# Patient Record
Sex: MALE | Race: WHITE | NOT HISPANIC OR LATINO | Employment: OTHER | URBAN - METROPOLITAN AREA
[De-identification: names, ages, dates, MRNs, and addresses within clinical notes are randomized per-mention and may not be internally consistent; named-entity substitution may affect disease eponyms.]

---

## 2024-02-29 ENCOUNTER — LAB (OUTPATIENT)
Dept: LAB | Facility: LAB | Age: 69
End: 2024-02-29
Payer: COMMERCIAL

## 2024-02-29 DIAGNOSIS — Z79.899 OTHER LONG TERM (CURRENT) DRUG THERAPY: ICD-10-CM

## 2024-02-29 DIAGNOSIS — Z79.899 OTHER LONG TERM (CURRENT) DRUG THERAPY: Primary | ICD-10-CM

## 2024-02-29 LAB
ALBUMIN SERPL BCP-MCNC: 3.8 G/DL (ref 3.4–5)
ALP SERPL-CCNC: 52 U/L (ref 33–136)
ALT SERPL W P-5'-P-CCNC: 14 U/L (ref 10–52)
AST SERPL W P-5'-P-CCNC: 13 U/L (ref 9–39)
BASOPHILS # BLD AUTO: 0.04 X10*3/UL (ref 0–0.1)
BASOPHILS NFR BLD AUTO: 0.4 %
BILIRUB DIRECT SERPL-MCNC: 0.1 MG/DL (ref 0–0.3)
BILIRUB SERPL-MCNC: 0.5 MG/DL (ref 0–1.2)
EOSINOPHIL # BLD AUTO: 0.21 X10*3/UL (ref 0–0.7)
EOSINOPHIL NFR BLD AUTO: 2.3 %
ERYTHROCYTE [DISTWIDTH] IN BLOOD BY AUTOMATED COUNT: 13.7 % (ref 11.5–14.5)
HCT VFR BLD AUTO: 43.8 % (ref 41–52)
HGB BLD-MCNC: 15 G/DL (ref 13.5–17.5)
IMM GRANULOCYTES # BLD AUTO: 0.07 X10*3/UL (ref 0–0.7)
IMM GRANULOCYTES NFR BLD AUTO: 0.8 % (ref 0–0.9)
LYMPHOCYTES # BLD AUTO: 2.34 X10*3/UL (ref 1.2–4.8)
LYMPHOCYTES NFR BLD AUTO: 25.9 %
MCH RBC QN AUTO: 33.4 PG (ref 26–34)
MCHC RBC AUTO-ENTMCNC: 34.2 G/DL (ref 32–36)
MCV RBC AUTO: 98 FL (ref 80–100)
MONOCYTES # BLD AUTO: 0.96 X10*3/UL (ref 0.1–1)
MONOCYTES NFR BLD AUTO: 10.6 %
NEUTROPHILS # BLD AUTO: 5.41 X10*3/UL (ref 1.2–7.7)
NEUTROPHILS NFR BLD AUTO: 60 %
NRBC BLD-RTO: 0 /100 WBCS (ref 0–0)
PLATELET # BLD AUTO: 248 X10*3/UL (ref 150–450)
PROT SERPL-MCNC: 6.1 G/DL (ref 6.4–8.2)
RBC # BLD AUTO: 4.49 X10*6/UL (ref 4.5–5.9)
WBC # BLD AUTO: 9 X10*3/UL (ref 4.4–11.3)

## 2024-02-29 PROCEDURE — 80076 HEPATIC FUNCTION PANEL: CPT

## 2024-02-29 PROCEDURE — 85025 COMPLETE CBC W/AUTO DIFF WBC: CPT

## 2024-02-29 PROCEDURE — 36415 COLL VENOUS BLD VENIPUNCTURE: CPT

## 2024-04-01 ENCOUNTER — HOSPITAL ENCOUNTER (EMERGENCY)
Facility: HOSPITAL | Age: 69
Discharge: HOME | End: 2024-04-01
Payer: COMMERCIAL

## 2024-04-01 ENCOUNTER — APPOINTMENT (OUTPATIENT)
Dept: RADIOLOGY | Facility: HOSPITAL | Age: 69
End: 2024-04-01
Payer: COMMERCIAL

## 2024-04-01 VITALS
HEART RATE: 74 BPM | HEIGHT: 65 IN | BODY MASS INDEX: 27.82 KG/M2 | TEMPERATURE: 97.2 F | OXYGEN SATURATION: 97 % | SYSTOLIC BLOOD PRESSURE: 114 MMHG | WEIGHT: 167 LBS | DIASTOLIC BLOOD PRESSURE: 73 MMHG | RESPIRATION RATE: 16 BRPM

## 2024-04-01 DIAGNOSIS — J02.9 ACUTE PHARYNGITIS, UNSPECIFIED ETIOLOGY: Primary | ICD-10-CM

## 2024-04-01 LAB
ANION GAP SERPL CALC-SCNC: 12 MMOL/L (ref 10–20)
BASOPHILS # BLD AUTO: 0.04 X10*3/UL (ref 0–0.1)
BASOPHILS NFR BLD AUTO: 0.5 %
BUN SERPL-MCNC: 27 MG/DL (ref 6–23)
CALCIUM SERPL-MCNC: 9.1 MG/DL (ref 8.6–10.3)
CHLORIDE SERPL-SCNC: 100 MMOL/L (ref 98–107)
CO2 SERPL-SCNC: 27 MMOL/L (ref 21–32)
CREAT SERPL-MCNC: 0.8 MG/DL (ref 0.5–1.3)
EGFRCR SERPLBLD CKD-EPI 2021: >90 ML/MIN/1.73M*2
EOSINOPHIL # BLD AUTO: 0.15 X10*3/UL (ref 0–0.7)
EOSINOPHIL NFR BLD AUTO: 1.7 %
ERYTHROCYTE [DISTWIDTH] IN BLOOD BY AUTOMATED COUNT: 13.6 % (ref 11.5–14.5)
GLUCOSE SERPL-MCNC: 98 MG/DL (ref 74–99)
HCT VFR BLD AUTO: 43.4 % (ref 41–52)
HGB BLD-MCNC: 15.2 G/DL (ref 13.5–17.5)
IMM GRANULOCYTES # BLD AUTO: 0.06 X10*3/UL (ref 0–0.7)
IMM GRANULOCYTES NFR BLD AUTO: 0.7 % (ref 0–0.9)
LYMPHOCYTES # BLD AUTO: 1.71 X10*3/UL (ref 1.2–4.8)
LYMPHOCYTES NFR BLD AUTO: 19.8 %
MCH RBC QN AUTO: 32.7 PG (ref 26–34)
MCHC RBC AUTO-ENTMCNC: 35 G/DL (ref 32–36)
MCV RBC AUTO: 93 FL (ref 80–100)
MONOCYTES # BLD AUTO: 1 X10*3/UL (ref 0.1–1)
MONOCYTES NFR BLD AUTO: 11.6 %
NEUTROPHILS # BLD AUTO: 5.68 X10*3/UL (ref 1.2–7.7)
NEUTROPHILS NFR BLD AUTO: 65.7 %
NRBC BLD-RTO: 0 /100 WBCS (ref 0–0)
PLATELET # BLD AUTO: 277 X10*3/UL (ref 150–450)
POTASSIUM SERPL-SCNC: 4.3 MMOL/L (ref 3.5–5.3)
RBC # BLD AUTO: 4.65 X10*6/UL (ref 4.5–5.9)
S PYO DNA THROAT QL NAA+PROBE: NOT DETECTED
SODIUM SERPL-SCNC: 135 MMOL/L (ref 136–145)
WBC # BLD AUTO: 8.6 X10*3/UL (ref 4.4–11.3)

## 2024-04-01 PROCEDURE — 85025 COMPLETE CBC W/AUTO DIFF WBC: CPT | Performed by: NURSE PRACTITIONER

## 2024-04-01 PROCEDURE — 80048 BASIC METABOLIC PNL TOTAL CA: CPT | Performed by: NURSE PRACTITIONER

## 2024-04-01 PROCEDURE — 36415 COLL VENOUS BLD VENIPUNCTURE: CPT | Performed by: NURSE PRACTITIONER

## 2024-04-01 PROCEDURE — 96361 HYDRATE IV INFUSION ADD-ON: CPT

## 2024-04-01 PROCEDURE — 87651 STREP A DNA AMP PROBE: CPT | Performed by: NURSE PRACTITIONER

## 2024-04-01 PROCEDURE — 99284 EMERGENCY DEPT VISIT MOD MDM: CPT | Mod: 25

## 2024-04-01 PROCEDURE — 96374 THER/PROPH/DIAG INJ IV PUSH: CPT

## 2024-04-01 PROCEDURE — 2500000004 HC RX 250 GENERAL PHARMACY W/ HCPCS (ALT 636 FOR OP/ED): Performed by: NURSE PRACTITIONER

## 2024-04-01 PROCEDURE — 2550000001 HC RX 255 CONTRASTS: Performed by: NURSE PRACTITIONER

## 2024-04-01 PROCEDURE — 70491 CT SOFT TISSUE NECK W/DYE: CPT

## 2024-04-01 PROCEDURE — 70491 CT SOFT TISSUE NECK W/DYE: CPT | Performed by: STUDENT IN AN ORGANIZED HEALTH CARE EDUCATION/TRAINING PROGRAM

## 2024-04-01 RX ADMIN — IOHEXOL 75 ML: 350 INJECTION, SOLUTION INTRAVENOUS at 19:12

## 2024-04-01 RX ADMIN — SODIUM CHLORIDE 500 ML: 9 INJECTION, SOLUTION INTRAVENOUS at 18:14

## 2024-04-01 RX ADMIN — DEXAMETHASONE SODIUM PHOSPHATE 10 MG: 4 INJECTION, SOLUTION INTRAMUSCULAR; INTRAVENOUS at 18:16

## 2024-04-01 ASSESSMENT — PAIN DESCRIPTION - PAIN TYPE: TYPE: CHRONIC PAIN

## 2024-04-01 ASSESSMENT — COLUMBIA-SUICIDE SEVERITY RATING SCALE - C-SSRS
6. HAVE YOU EVER DONE ANYTHING, STARTED TO DO ANYTHING, OR PREPARED TO DO ANYTHING TO END YOUR LIFE?: NO
2. HAVE YOU ACTUALLY HAD ANY THOUGHTS OF KILLING YOURSELF?: NO
1. IN THE PAST MONTH, HAVE YOU WISHED YOU WERE DEAD OR WISHED YOU COULD GO TO SLEEP AND NOT WAKE UP?: NO

## 2024-04-01 ASSESSMENT — LIFESTYLE VARIABLES
HAVE YOU EVER FELT YOU SHOULD CUT DOWN ON YOUR DRINKING: NO
HAVE PEOPLE ANNOYED YOU BY CRITICIZING YOUR DRINKING: NO
TOTAL SCORE: 0
EVER HAD A DRINK FIRST THING IN THE MORNING TO STEADY YOUR NERVES TO GET RID OF A HANGOVER: NO
EVER FELT BAD OR GUILTY ABOUT YOUR DRINKING: NO

## 2024-04-01 ASSESSMENT — PAIN - FUNCTIONAL ASSESSMENT: PAIN_FUNCTIONAL_ASSESSMENT: 0-10

## 2024-04-01 ASSESSMENT — PAIN SCALES - GENERAL: PAINLEVEL_OUTOF10: 4

## 2024-04-01 ASSESSMENT — PAIN DESCRIPTION - LOCATION: LOCATION: BACK

## 2024-04-01 ASSESSMENT — PAIN DESCRIPTION - ORIENTATION: ORIENTATION: LOWER

## 2024-04-01 NOTE — ED PROVIDER NOTES
HPI   Chief Complaint   Patient presents with    Sore Throat     Started 3/25/24, worsened on 3/29/24 and was unable to eat or drink much due to the discomfort. Saw UC 3/30/24, gave him antibiotics and some improvement is noted.     Ear Fullness    Back Pain    Neck Pain       This is a 68-year-old  male presenting to the emergency room with complaints of a sore throat for the past week and a half.  The patient reported that he was having a hard time swallowing.  The patient had to eat and everything bagel on Thursday morning.  The patient had complaints of right ear pain that developed Friday.  The patient was coughing up clear phlegm.  He took cold medicine over-the-counter reported no improvement of his symptoms.  He went to the urgent care on Saturday.  He reported that he was having a hard time turning his head.  He was diagnosed with an ear infection and strep throat.  The patient was given a prescription for amoxicillin.  He has been taking the medication for the past 2 days.  The patient reports that he was able to force down a protein drink and his antibiotic.  He did not take any of his other medications.  He has been checking his blood sugars and his blood pressure.  The patient reported that when he went was able to force anything down it did not come back up.  He is not having any fevers.  Denies any chest pain, shortness of breath, dizziness, palpitations, paresthesias, focal weakness.  He is not having any abdominal pain.  He reports that he has a deviated esophagus.  He tried to get into ENT today and was told by his primary care physician at [facility] to come to the emergency room for evaluation.      History provided by:  Patient   used: No                        Pascual Coma Scale Score: 15                     Patient History   No past medical history on file.  No past surgical history on file.  No family history on file.  Social History     Tobacco Use    Smoking  status: Former     Types: Cigarettes    Smokeless tobacco: Never   Vaping Use    Vaping Use: Never used   Substance Use Topics    Alcohol use: Not Currently    Drug use: Never       Physical Exam   ED Triage Vitals [04/01/24 1525]   Temperature Heart Rate Respirations BP   36.2 °C (97.2 °F) 89 18 111/74      Pulse Ox Temp Source Heart Rate Source Patient Position   96 % Temporal Monitor Sitting      BP Location FiO2 (%)     Left arm --       Physical Exam  HENT:      Head: Normocephalic and atraumatic.      Right Ear: Hearing and ear canal normal. Tympanic membrane is erythematous.      Left Ear: Hearing, tympanic membrane and ear canal normal.      Nose: Nose normal.      Mouth/Throat:      Mouth: Mucous membranes are moist.      Pharynx: Uvula midline. Posterior oropharyngeal erythema present.      Tonsils: No tonsillar exudate or tonsillar abscesses. 1+ on the right. 1+ on the left.   Cardiovascular:      Rate and Rhythm: Normal rate and regular rhythm.      Pulses: Normal pulses.      Heart sounds: Normal heart sounds.   Pulmonary:      Effort: Pulmonary effort is normal.      Breath sounds: Normal breath sounds.   Abdominal:      General: Bowel sounds are normal.      Palpations: Abdomen is soft.   Musculoskeletal:         General: Normal range of motion.      Cervical back: Normal range of motion.   Lymphadenopathy:      Cervical: No cervical adenopathy.   Skin:     General: Skin is warm.      Capillary Refill: Capillary refill takes less than 2 seconds.   Neurological:      General: No focal deficit present.      Mental Status: He is alert.   Psychiatric:         Mood and Affect: Mood normal.         ED Course & MDM   Diagnoses as of 04/01/24 2036   Acute pharyngitis, unspecified etiology       Medical Decision Making  Patient was seen and evaluated by the nurse practitioner, Le Mejias.  The patient was able to swallow and maintain his oral secretions.  The patient's vital signs were stable.  He did not  appear to be in any acute distress.  Differential diagnosis includes epiglottitis, Peritonsillar abscess, strep pharyngitis, tonsillitis, pharyngitis, acute obstruction, Cuauhtemoc's angina, or other acute process.  Patient has no clinical indication of Cuauhtemoc's angina or acute obstruction.  A saline lock was established.  Laboratory studies were drawn with results as noted.  The patient was administered a 500 mL normal saline bolus and Decadron 10 mg IVP.  The patient's laboratory studies showed he does not have any acute leukocytosis.  A rapid strep test was performed and was negative.  A CT of the neck with contrast was performed and showed diffuse thickening of the oropharyngeal mucosa and palatine tonsils with mild rigidity of the right palatine tonsil without peritonsillar abscess.  Findings are most consistent with pharyngitis or tonsillitis.  There are no enlarged lymph nodes.  Patient was notified of his laboratory and imaging results.  He was advised to continue using his antibiotic as prescribed.  He is to provide symptomatic care.  The patient is to follow up with their primary care physician in the next 2-3 days.  The patient is to return to the ED worse in any way.  The patient was discharged in stable condition with computer discharge instructions given. Patient was agreeable with discharge planning.        Procedure  Procedures     EMORY Crystal-LYNDSAY  04/01/24 2040

## 2024-04-03 PROBLEM — Z00.00 ROUTINE HEALTH MAINTENANCE: Status: ACTIVE | Noted: 2024-04-03

## 2024-04-03 PROBLEM — M25.511 CHRONIC RIGHT SHOULDER PAIN: Status: ACTIVE | Noted: 2022-12-13

## 2024-04-03 PROBLEM — G89.29 CHRONIC RIGHT SHOULDER PAIN: Status: ACTIVE | Noted: 2022-12-13

## 2024-04-03 PROBLEM — I10 HYPERTENSION: Status: ACTIVE | Noted: 2024-04-03

## 2024-04-03 PROBLEM — I50.9: Status: ACTIVE | Noted: 2024-04-03

## 2024-04-03 PROBLEM — M79.672 LEFT FOOT PAIN: Status: ACTIVE | Noted: 2024-02-16

## 2024-04-03 PROBLEM — I49.3 PVC (PREMATURE VENTRICULAR CONTRACTION): Status: ACTIVE | Noted: 2018-12-06

## 2024-04-03 PROBLEM — H91.90 HEARING LOSS: Status: ACTIVE | Noted: 2022-11-17

## 2024-04-03 PROBLEM — G95.89 MYELOMALACIA OF CERVICAL CORD (MULTI): Status: ACTIVE | Noted: 2024-03-14

## 2024-04-03 PROBLEM — M25.551 PAIN OF RIGHT HIP: Status: ACTIVE | Noted: 2024-03-14

## 2024-04-03 PROBLEM — I25.10 CORONARY ARTERY DISEASE INVOLVING NATIVE CORONARY ARTERY OF NATIVE HEART WITHOUT ANGINA PECTORIS: Status: ACTIVE | Noted: 2019-06-20

## 2024-04-03 PROBLEM — I42.8 CARDIOMYOPATHY, NONISCHEMIC (MULTI): Status: ACTIVE | Noted: 2024-04-03

## 2024-04-03 PROBLEM — M47.12 CERVICAL SPONDYLOSIS WITH MYELOPATHY: Status: ACTIVE | Noted: 2022-08-29

## 2024-04-03 PROBLEM — Z86.010 HISTORY OF COLONIC POLYPS: Status: ACTIVE | Noted: 2023-11-14

## 2024-04-03 PROBLEM — E53.8 B12 DEFICIENCY: Status: ACTIVE | Noted: 2020-03-13

## 2024-04-03 PROBLEM — M54.16 LUMBAR RADICULITIS: Status: ACTIVE | Noted: 2024-03-14

## 2024-04-03 PROBLEM — Z95.810 S/P ICD (INTERNAL CARDIAC DEFIBRILLATOR) PROCEDURE: Status: ACTIVE | Noted: 2024-04-03

## 2024-04-03 PROBLEM — Z86.0100 HISTORY OF COLONIC POLYPS: Status: ACTIVE | Noted: 2023-11-14

## 2024-04-03 PROBLEM — M16.9 OA (OSTEOARTHRITIS) OF HIP: Status: ACTIVE | Noted: 2024-03-14

## 2024-04-03 PROBLEM — H91.90 HEARING LOSS: Status: RESOLVED | Noted: 2022-11-17 | Resolved: 2024-04-03

## 2024-04-03 PROBLEM — D12.6 TUBULAR ADENOMA OF COLON: Status: ACTIVE | Noted: 2018-12-21

## 2024-04-03 NOTE — ASSESSMENT & PLAN NOTE
Insulin Instructions   insulin glargine (Lantus SoloStar) 100 UNIT/ML SOPN injection   Inject 28 Units under the skin at bedtime.     Biguanides Instructions   metformin (GLUCOPHAGE) 1000 MG tablet   TAKE 1 TABLET BY MOUTH 2 TIMES DAILY WITH MEALS.     Sodium-Glucose Co-Transporter 2 (SGLT2) Inhibitors Instructions   empagliflozin (Jardiance) 10 MG tablet   Take 1 Tablet by mouth daily.     Incretin Mimetic Agents Instructions   liraglutide (VICTOZA) 18 MG/3ML SOPN injection   Inject 1.8 mg under the skin daily.

## 2024-04-08 ENCOUNTER — OFFICE VISIT (OUTPATIENT)
Dept: OTOLARYNGOLOGY | Facility: CLINIC | Age: 69
End: 2024-04-08
Payer: COMMERCIAL

## 2024-04-08 VITALS — BODY MASS INDEX: 29.45 KG/M2 | TEMPERATURE: 96.1 F | WEIGHT: 177 LBS

## 2024-04-08 DIAGNOSIS — R13.10 DYSPHAGIA, UNSPECIFIED TYPE: Primary | ICD-10-CM

## 2024-04-08 DIAGNOSIS — R49.0 HOARSENESS OF VOICE: ICD-10-CM

## 2024-04-08 PROCEDURE — 31575 DIAGNOSTIC LARYNGOSCOPY: CPT

## 2024-04-08 PROCEDURE — 99204 OFFICE O/P NEW MOD 45 MIN: CPT

## 2024-04-08 PROCEDURE — 1036F TOBACCO NON-USER: CPT

## 2024-04-08 PROCEDURE — 4010F ACE/ARB THERAPY RXD/TAKEN: CPT

## 2024-04-08 PROCEDURE — 1160F RVW MEDS BY RX/DR IN RCRD: CPT

## 2024-04-08 PROCEDURE — 1159F MED LIST DOCD IN RCRD: CPT

## 2024-04-08 RX ORDER — EMPAGLIFLOZIN 10 MG/1
10 TABLET, FILM COATED ORAL
COMMUNITY
Start: 2024-01-23

## 2024-04-08 RX ORDER — FUROSEMIDE 40 MG/1
40 TABLET ORAL
COMMUNITY
Start: 2023-05-30 | End: 2024-05-29

## 2024-04-08 RX ORDER — INSULIN GLARGINE 100 [IU]/ML
28 INJECTION, SOLUTION SUBCUTANEOUS
COMMUNITY
Start: 2023-11-17

## 2024-04-08 RX ORDER — LISINOPRIL 5 MG/1
2.5 TABLET ORAL
COMMUNITY
Start: 2012-07-18

## 2024-04-08 RX ORDER — ATORVASTATIN CALCIUM 40 MG/1
40 TABLET, FILM COATED ORAL
COMMUNITY
Start: 2023-09-20 | End: 2024-09-19

## 2024-04-08 RX ORDER — LORATADINE 10 MG/1
10 TABLET ORAL
COMMUNITY
Start: 2023-09-20 | End: 2024-09-19

## 2024-04-08 RX ORDER — SPIRONOLACTONE 25 MG/1
12.5 TABLET ORAL
COMMUNITY
Start: 2023-11-03

## 2024-04-08 RX ORDER — AMOXICILLIN 875 MG/1
TABLET, FILM COATED ORAL
COMMUNITY
Start: 2024-03-30 | End: 2024-05-14 | Stop reason: ALTCHOICE

## 2024-04-08 RX ORDER — METOPROLOL SUCCINATE 200 MG/1
200 TABLET, EXTENDED RELEASE ORAL
COMMUNITY
Start: 2024-01-22 | End: 2025-01-21

## 2024-04-08 RX ORDER — METFORMIN HYDROCHLORIDE 1000 MG/1
1 TABLET ORAL
COMMUNITY
Start: 2023-08-04

## 2024-04-08 RX ORDER — ASPIRIN 81 MG/1
81 TABLET ORAL
COMMUNITY
Start: 2023-04-24

## 2024-04-08 ASSESSMENT — PATIENT HEALTH QUESTIONNAIRE - PHQ9
2. FEELING DOWN, DEPRESSED OR HOPELESS: NOT AT ALL
SUM OF ALL RESPONSES TO PHQ9 QUESTIONS 1 AND 2: 0
1. LITTLE INTEREST OR PLEASURE IN DOING THINGS: NOT AT ALL

## 2024-04-08 NOTE — PROGRESS NOTES
"Reason For Consult  Chief Complaint   Patient presents with    Dysphagia        HISTORY OF PRESENT ILLNESS:   Hilaire J Kiffer, who is a 68 y.o. male presenting for an initial visit for dysphagia and mild hoarseness.  The patient reports that the dysphagia started around 2 weeks ago. The patient developed painful swallowing and was unable to get food or liquids down for a 3 day period. Patient went to ED on 4/1/24 and was diagnosed with acute pharyngitis and received amoxicillin. Patient states his swallow has improved but has not been the same since prior spinal fusion procedure. Patient currently able to tolerate protein shakes and  food. Patient denies coughing or choking, states was previously unable to get food or liquids down, \" it was just sitting in throat\". Patient able to maintain weight with current diet.     Hoarseness:  Onset was one week ago.  This occurred suddenly.  Voice has worsened since onset.  Voice currently is characterized as mild hoarseness.     Past Medical History  He has a past medical history of H/O abdominal ultrasound (11/19/2020), H/O CT scan (04/01/2024), H/O magnetic resonance imaging of cervical spine (11/28/2023), H/O magnetic resonance imaging of cervical spine (09/06/2022), H/O pelvic x-ray (03/14/2024), and H/O x-ray of lumbar spine (03/14/2024).  Surgical History  He has no past surgical history on file.     Social History  He reports that he has quit smoking. His smoking use included cigarettes. He has never used smokeless tobacco. He reports that he does not currently use alcohol. He reports that he does not use drugs.    Allergies  Patient has no known allergies.    Review of Systems  All 10 systems were reviewed and negative except for above.      Physical Exam  CONSTITUTIONAL: Well developed, well nourished.    VOICE: mild hoarseness  RESPIRATION: Breathing comfortably, no stridor.    NEURO: Alert and oriented x3, cranial nerves II-XII intact and symmetric bilaterally.  "   EARS: Normal external ears, external auditory canals, normal hearing to conversational voice.    NOSE: External nose midline, anterior rhinoscopy is normal with limited visualization to the anterior aspect of the interior turbinates. No lesions noted.     ORAL CAVITY/OROPHARYNX/LIPS: Normal mucous membranes, normal floor of mouth/tongue/OP, no masses or lesions are noted.    SKIN: Neck skin left side scar from previous spinal procedure.   PSYCH: Alert and oriented with appropriate mood and affect.        Last Recorded Vitals  There were no vitals taken for this visit.    Procedure  PROCEDURE NOTE:  Recommended flexible laryngoscopy/stroboscopy.  Risks, benefits,  and alternatives were explained.  They wish to proceed and provide verbal consent.     PROCEDURE:  Flexible Laryngoscopy, CPT 67812    INDICATIONS: Inability to tolerate mirror exam or abnormal findings on mirror, Flexible Laryngoscopy/Stroboscopy performed to assess one of the followin. Diagnosis of symptomatic disorder involving the voice, swallow, upper aerodigestive tract, including GOLD disorders, or  2. Preoperative evaluation of vocal cord function for individuals undergoing surgery where the RLN or vagus nerves are at risk of injury, or  3. Further evaluation of abnormalities of the upper aerodigestive tract discovered by another modality, such as CT, MRI, bronchoscopy or EGD    Description of Procedure:    After adequate afrin and lidocaine spray, I advanced the endoscope.  Visualization of the nasopharynx, vallecula, posterior pharyngeal walls, pyriform, epiglottis and post cricoid areas was unremarkable.  The following laryngeal findings were noted:    vocal cord movement was bilateral weakness L>R  closure was bowing  Mucosal wave was not assessed   Compression was increased AP and  FVC L>R  edema was  mild  interarytenoid edema mild  lesions were none  the subglottis was widely patent  Pharyngeal wall squeeze was normal    Procedure well  tolerated.       ASSESSMENT AND PLAN:   This is an initial visit for dysphagia with clinical findings notable for bilateral vocal cord weakness L>R and increased FVC compression L>R. Swallowing is improved but still having some issues. Patient   Secondary issues identified and managed on this patient visit include: mild hoarseness    Diagnoses are exacerbated by: recent pharyngitis. Patient finishing scheduled antibiotic tomorrow.     We discussed the treatment options to include, medical and surgical options.  We have decided to proceed as follows:   1. Modified barium swallow.    2. Speech therapy for voice   3. Follow up in 2-3 months.

## 2024-04-09 ENCOUNTER — APPOINTMENT (OUTPATIENT)
Dept: PRIMARY CARE | Facility: CLINIC | Age: 69
End: 2024-04-09
Payer: COMMERCIAL

## 2024-04-22 ENCOUNTER — HOSPITAL ENCOUNTER (OUTPATIENT)
Dept: RADIOLOGY | Facility: HOSPITAL | Age: 69
Discharge: HOME | End: 2024-04-22
Payer: COMMERCIAL

## 2024-04-22 DIAGNOSIS — R13.12 OROPHARYNGEAL DYSPHAGIA: Primary | ICD-10-CM

## 2024-04-22 DIAGNOSIS — R13.10 DYSPHAGIA, UNSPECIFIED TYPE: ICD-10-CM

## 2024-04-22 PROCEDURE — 92611 MOTION FLUOROSCOPY/SWALLOW: CPT | Mod: GN | Performed by: PHARMACIST

## 2024-04-22 PROCEDURE — 2500000001 HC RX 250 WO HCPCS SELF ADMINISTERED DRUGS (ALT 637 FOR MEDICARE OP)

## 2024-04-22 PROCEDURE — 3430000001 HC RX 343 DIAGNOSTIC RADIOPHARMACEUTICALS

## 2024-04-22 PROCEDURE — 74230 X-RAY XM SWLNG FUNCJ C+: CPT

## 2024-04-22 PROCEDURE — 74230 X-RAY XM SWLNG FUNCJ C+: CPT | Performed by: RADIOLOGY

## 2024-04-22 RX ADMIN — BARIUM SULFATE 30 ML: 400 SUSPENSION ORAL at 13:37

## 2024-04-22 RX ADMIN — BARIUM SULFATE 700 MG: 700 TABLET ORAL at 13:36

## 2024-04-22 RX ADMIN — BARIUM SULFATE 10 ML: 400 PASTE ORAL at 13:36

## 2024-04-22 RX ADMIN — BARIUM SULFATE 5 ML: 400 SUSPENSION ORAL at 13:37

## 2024-04-22 RX ADMIN — BARIUM SULFATE 110 ML: 0.81 POWDER, FOR SUSPENSION ORAL at 13:37

## 2024-04-23 ENCOUNTER — APPOINTMENT (OUTPATIENT)
Dept: OTOLARYNGOLOGY | Facility: CLINIC | Age: 69
End: 2024-04-23
Payer: COMMERCIAL

## 2024-04-23 PROBLEM — R13.12 OROPHARYNGEAL DYSPHAGIA: Status: ACTIVE | Noted: 2024-04-23

## 2024-04-23 NOTE — PROCEDURES
"  Modified Barium Swallow Study     Patient Name: Hilaire J Kiffer  MRN: [id number]  : 1955  Today's Date: 24   Start time:  1301  Stop time:  1336  Time calculation (min) :  35 min       Recommendations:  Continue current diet of soft/bite-sized solids and thin liquids. Upright positioning during meals, slow rate of intake, small bites/sips, chew thoroughly, alternate liquids and solids as needed.     Assessment/Impression:    Full detailed SLP/Radiologist Modified Barium Swallow study report can be found under Chart Review tab, Imaging tab and  titled \"FL Modified Barium Swallow Study\"      Pt. Presenting with mild oropharyngeal dysphagia characterized by intermittent penetration of thin and nectar-thick liquids which pt cleared independently with throat clear maneuvers, and min pharyngeal residuals which pt reduced to trace with spontaneous dry swallows.    Plan:  Treatment/Interventions: Pharyngeal exercises, Oral motor exercises, Patient/family education, Bolus trials  SLP Plan: Pt would benefit from a course of dysphagia therapy to improve safety/efficiency with regular texture solids as well as to reduce penetration/aspiration risk. Pt was informed of this recommendation but declined to participate in ST at this time, reporting that he hopes to continue to improve without intervention. Pt was advised that he may request a referral for dysphagia therapy in the future if needs change.    Discussed POC: Patient  Patient/Caregiver Agreeable: No    Pain:   Rating 0-10: 0      Education:   Pt viewed flouro images while SLP educated re: swallow anatomy/physiology, results of study, risk for aspiration, recommended diet modifications, recommendation for skilled SLP services, and recommended safe swallow strategies. Note that increased instances of laryngeal penetration were observed during post-session playback compared to initial education session.  "

## 2024-04-25 ENCOUNTER — TELEPHONE (OUTPATIENT)
Dept: OTOLARYNGOLOGY | Facility: CLINIC | Age: 69
End: 2024-04-25
Payer: COMMERCIAL

## 2024-04-25 NOTE — TELEPHONE ENCOUNTER
Discussed results of Modified Barium Swallow with patient that was done on 04/22/2024. Patient did not want dysphagia therapy, but is willing to see Dr. Hopper for other options with oropharyngeal dysphagia. Will have  reach out to schedule appointment with Dr. Hopper.

## 2024-05-14 ENCOUNTER — OFFICE VISIT (OUTPATIENT)
Dept: PRIMARY CARE | Facility: CLINIC | Age: 69
End: 2024-05-14
Payer: COMMERCIAL

## 2024-05-14 ENCOUNTER — OFFICE VISIT (OUTPATIENT)
Dept: OTOLARYNGOLOGY | Facility: CLINIC | Age: 69
End: 2024-05-14
Payer: COMMERCIAL

## 2024-05-14 VITALS
WEIGHT: 191.6 LBS | TEMPERATURE: 95.6 F | SYSTOLIC BLOOD PRESSURE: 122 MMHG | BODY MASS INDEX: 30.79 KG/M2 | DIASTOLIC BLOOD PRESSURE: 76 MMHG | HEIGHT: 66 IN | OXYGEN SATURATION: 95 % | HEART RATE: 63 BPM

## 2024-05-14 VITALS — TEMPERATURE: 97.3 F | WEIGHT: 190.8 LBS | BODY MASS INDEX: 30.67 KG/M2 | HEIGHT: 66 IN

## 2024-05-14 DIAGNOSIS — Z00.00 ROUTINE HEALTH MAINTENANCE: ICD-10-CM

## 2024-05-14 DIAGNOSIS — E53.8 B12 DEFICIENCY: Primary | ICD-10-CM

## 2024-05-14 DIAGNOSIS — D12.6 TUBULAR ADENOMA OF COLON: ICD-10-CM

## 2024-05-14 DIAGNOSIS — F19.11 DRUG ABUSE IN REMISSION (MULTI): ICD-10-CM

## 2024-05-14 DIAGNOSIS — I25.10 CORONARY ARTERY DISEASE INVOLVING NATIVE CORONARY ARTERY OF NATIVE HEART WITHOUT ANGINA PECTORIS: ICD-10-CM

## 2024-05-14 DIAGNOSIS — E11.42 TYPE 2 DIABETES MELLITUS WITH DIABETIC POLYNEUROPATHY, WITH LONG-TERM CURRENT USE OF INSULIN (MULTI): ICD-10-CM

## 2024-05-14 DIAGNOSIS — I50.9 CONGESTIVE HEART FAILURE, NYHA CLASS 2, UNSPECIFIED CONGESTIVE HEART FAILURE TYPE (MULTI): ICD-10-CM

## 2024-05-14 DIAGNOSIS — I10 PRIMARY HYPERTENSION: ICD-10-CM

## 2024-05-14 DIAGNOSIS — R13.10 DYSPHAGIA, UNSPECIFIED TYPE: Primary | ICD-10-CM

## 2024-05-14 DIAGNOSIS — Z98.890 HISTORY OF CERVICAL SPINAL SURGERY: ICD-10-CM

## 2024-05-14 DIAGNOSIS — M47.12 CERVICAL SPONDYLOSIS WITH MYELOPATHY: ICD-10-CM

## 2024-05-14 DIAGNOSIS — S59.901A INJURY OF RIGHT ELBOW, INITIAL ENCOUNTER: ICD-10-CM

## 2024-05-14 DIAGNOSIS — H90.8 MIXED HEARING LOSS, UNILATERAL: ICD-10-CM

## 2024-05-14 DIAGNOSIS — Z12.5 SCREENING FOR PROSTATE CANCER: ICD-10-CM

## 2024-05-14 DIAGNOSIS — I47.29 PAROXYSMAL VENTRICULAR TACHYCARDIA (MULTI): ICD-10-CM

## 2024-05-14 DIAGNOSIS — E55.9 VITAMIN D DEFICIENCY: ICD-10-CM

## 2024-05-14 DIAGNOSIS — Z79.4 TYPE 2 DIABETES MELLITUS WITH DIABETIC POLYNEUROPATHY, WITH LONG-TERM CURRENT USE OF INSULIN (MULTI): ICD-10-CM

## 2024-05-14 DIAGNOSIS — R13.12 OROPHARYNGEAL DYSPHAGIA: ICD-10-CM

## 2024-05-14 PROBLEM — G95.89 MYELOMALACIA OF CERVICAL CORD (MULTI): Status: RESOLVED | Noted: 2024-03-14 | Resolved: 2024-05-14

## 2024-05-14 PROCEDURE — 4010F ACE/ARB THERAPY RXD/TAKEN: CPT | Performed by: OTOLARYNGOLOGY

## 2024-05-14 PROCEDURE — 31575 DIAGNOSTIC LARYNGOSCOPY: CPT | Performed by: OTOLARYNGOLOGY

## 2024-05-14 PROCEDURE — 3078F DIAST BP <80 MM HG: CPT | Performed by: NURSE PRACTITIONER

## 2024-05-14 PROCEDURE — 99215 OFFICE O/P EST HI 40 MIN: CPT | Performed by: OTOLARYNGOLOGY

## 2024-05-14 PROCEDURE — 1159F MED LIST DOCD IN RCRD: CPT | Performed by: OTOLARYNGOLOGY

## 2024-05-14 PROCEDURE — 1159F MED LIST DOCD IN RCRD: CPT | Performed by: NURSE PRACTITIONER

## 2024-05-14 PROCEDURE — 99205 OFFICE O/P NEW HI 60 MIN: CPT | Performed by: NURSE PRACTITIONER

## 2024-05-14 PROCEDURE — 1160F RVW MEDS BY RX/DR IN RCRD: CPT | Performed by: OTOLARYNGOLOGY

## 2024-05-14 PROCEDURE — 1036F TOBACCO NON-USER: CPT | Performed by: NURSE PRACTITIONER

## 2024-05-14 PROCEDURE — 4010F ACE/ARB THERAPY RXD/TAKEN: CPT | Performed by: NURSE PRACTITIONER

## 2024-05-14 PROCEDURE — G2211 COMPLEX E/M VISIT ADD ON: HCPCS | Performed by: NURSE PRACTITIONER

## 2024-05-14 PROCEDURE — 1036F TOBACCO NON-USER: CPT | Performed by: OTOLARYNGOLOGY

## 2024-05-14 PROCEDURE — 3074F SYST BP LT 130 MM HG: CPT | Performed by: NURSE PRACTITIONER

## 2024-05-14 PROCEDURE — 1160F RVW MEDS BY RX/DR IN RCRD: CPT | Performed by: NURSE PRACTITIONER

## 2024-05-14 RX ORDER — RANOLAZINE 1000 MG/1
1000 TABLET, EXTENDED RELEASE ORAL 2 TIMES DAILY
COMMUNITY

## 2024-05-14 ASSESSMENT — PATIENT HEALTH QUESTIONNAIRE - PHQ9
2. FEELING DOWN, DEPRESSED OR HOPELESS: NOT AT ALL
2. FEELING DOWN, DEPRESSED OR HOPELESS: NOT AT ALL
1. LITTLE INTEREST OR PLEASURE IN DOING THINGS: NOT AT ALL
SUM OF ALL RESPONSES TO PHQ9 QUESTIONS 1 & 2: 0
SUM OF ALL RESPONSES TO PHQ9 QUESTIONS 1 AND 2: 0
1. LITTLE INTEREST OR PLEASURE IN DOING THINGS: NOT AT ALL

## 2024-05-14 NOTE — PROGRESS NOTES
Patient: Hilaire J Kiffer   MRN: [id number] YOB: 1955   Sex: male Age: 68 y.o.  Date of Service: 2024       ASSESSMENT AND PLAN  I discussed the findings with Hilaire J Kiffer and have recommended the followin. Dysphagia acute on chronic, history of ACDF, acute dysphagia resolving. We discussed esophagoscopy and dilation for his baseline solid food dysphagia but he would like to hold off for now. He will contact our office if he is interested in proceeding. [place] [facility] [place] [facility] given he has a pacemaker/defibrillator      CHIEF COMPLAINT  Chief Complaint   Patient presents with    Dysphagia       HISTORY OF PRESENT ILLNESS  Hilaire J Kiffer is a 68 y.o. male referred by Alen Lala APRN-C* for evaluation of dysphagia.  The patient has a history of dysphagia to solids and liquids s/p cervical fusion surgery approx. 2 years ago. Since then his swallow has been difficult and worsened when pt became ill 6 weeks ago with a sore throat and an ear ache such that he was unable to swallow anything. He went 3 days without eating. He went to urgent care and got amoxicillin. Then went to the ED at [place] where he had a CT scan. Now he reports is swallowing is slowly improving, about where he was before this most recent episode.    The dysphagia history includes:      Dysphagia for solids               yes    Dysphagia for liquids              no    Dysphagia for pills                  no  Associated weight loss            no    Recent pneumonia/bronchitis  no  GERD/Acid reflux   no    PMH: DM, pacemaker/defibrillator  Meds: No blood thinners  SH: former smoker    ADDITIONAL HISTORY  Past Medical History  He has a past medical history of H/O abdominal ultrasound (2020), H/O CT scan (2024), H/O magnetic resonance imaging of cervical spine (2023), H/O magnetic resonance imaging of cervical spine (2022), H/O pelvic x-ray (2024), and H/O x-ray of  "lumbar spine (03/14/2024). Surgical History  He has a past surgical history that includes pacemaker placement.   Social History  He reports that he has quit smoking. His smoking use included cigarettes. He has never used smokeless tobacco. He reports that he does not currently use alcohol. He reports that he does not use drugs. Allergies  Patient has no known allergies.     Family History  Family History   Problem Relation Name Age of Onset    Heart attack Mother      Heart attack Father      Diabetes type II Father      Alzheimer's disease Father      Heart attack Brother  52        REVIEW OF SYSTEMS  All 10 systems were reviewed and negative except for above.      PHYSICAL EXAM  ENT Physical Exam   GENERAL: Well-nourished and developed, alert and appropriate, no distress, voice [id number]  RESPIRATORY: Breathing quietly, no stridor  HEAD: Normocephalic atraumatic  FACE: Symmetric, no masses or lesions  EYES:  Pupils reactive, sclera clear, external ocular muscles intact, no nystagmus.    EARS:  Pinnae normal.  NOSE:  No anterior lesions, masses or polyps.  ORAL CAVITY/OROPHARYNX:  Buccal mucosa is moist without lesions or masses, tongue midline and palate elevates symmetrically. Tongue mobility intact.  NECK:  Soft. There is no lymphadenopathy or thyromegaly.    NEUROLOGIC:  Cranial nerves II-XII grossly intact.       Last Recorded Vitals  Temperature 36.3 °C (97.3 °F), height 1.664 m (5' 5.5\"), weight 86.5 kg (190 lb 12.8 oz).    RESULTS    Patient Reported Outcome Measures  N/A    Laboratory, Radiology, and Pathology  I personally reviewed the following results, with the following interpretation:   [facility] [place]        PROCEDURES  Laryngoscopy    Date/Time: 5/14/2024 1:58 PM    Performed by: Yeimy Hopper MD  Authorized by: Yeimy Hopper MD       Flexible Fiberoptic Laryngoscopy     Patient failed a mirror exam due to limitations of equipment and the need for laryngoscopy to assess laryngeal anatomy and " function    PREOPERATIVE DIAGNOSIS: dysphagia    POSTOPERATIVE DIAGNOSIS: Same    PROCEDURE: Transnasal videolaryngoscopy    ANESTHESIA:  Topical    COMPLICATIONS:  None    SPECIMENS:  None    PROCEDURE IN DETAIL:  The patient was brought into the endoscopy suite, placed in the upright position.  The nasal cavity was topically decongested anesthetized.  The distal chip video laryngoscope was passed through the nasal cavity.  The nasal cavity and nasopharynx were within normal limits except noted below. The following findings on laryngoscopy were noted:         Tongue Base: no masses or lesions          Left vocal fold mobility: mobile         Right vocal fold mobility: mobile         Glottal closure: complete         Laryngeal muscle tension: L>R FF tension         Symmetry: mildly asymmetric         Vocal fold free edge: no masses or lesions          Other: n/a    The patient tolerated the procedure well.        ----------------------------------------------------------------------  Yeimy Hopper MD, MAEd    [facility]  Department of Otolaryngology - Head and Neck Surgery  [facility]    The total time I spent in care of this patient today (excluding time spent on other billable services) is as follows:    Time Spent  Prep time on day of patient encounter: 10 minutes  Time spent directly with patient, family or caregiver: 20 minutes  Additional Time Spent on Patient Care Activities: 5 minutes  Documentation Time: 10 minutes  Other Time Spent: 0 minutes  Total: 45 minutes

## 2024-05-14 NOTE — PATIENT INSTRUCTIONS
Routine fasting labs at your convenience  Schedule physical exam with PCP  XR elbow today    Check insurance regarding Shingrix    I will get back to you on supplement

## 2024-05-14 NOTE — PROGRESS NOTES
Subjective   Patient ID: Hilaire J Kiffer is a 68 y.o. male who presents for new patient establish care (New pt establish care/No concerns).    Saw ent  For throat closing  Saw muscle in neck is not retracting all the way  Likely from neck surgery  At 1p today, seeing  Right hip pain  Arthritis  Want to take umary supplement  Had a fall 2 mo ago  Hit elbow right side  C-scope [facility] a few years ago        Review of Systems  ROS completely negative except what was mentioned in the HPI.  Problem List, surgical, social, and family histories which were reviewed and updated as necessary within the EMR. I also personally reviewed the notes, labs, and imaging that pertained to what was documented or discussed in the HPI.    Objective   Physical Exam  Nursing note reviewed.   Constitutional:       Appearance: Normal appearance.   HENT:      Head: Normocephalic.      Right Ear: Tympanic membrane, ear canal and external ear normal.      Left Ear: Tympanic membrane, ear canal and external ear normal.      Nose: Nose normal.      Mouth/Throat:      Mouth: Mucous membranes are moist.      Pharynx: Oropharynx is clear.   Eyes:      Extraocular Movements: Extraocular movements intact.      Conjunctiva/sclera: Conjunctivae normal.      Pupils: Pupils are equal, round, and reactive to light.   Cardiovascular:      Rate and Rhythm: Normal rate and regular rhythm.      Pulses: Normal pulses.      Heart sounds: Normal heart sounds.   Pulmonary:      Effort: Pulmonary effort is normal.   Musculoskeletal:         General: Normal range of motion.      Cervical back: Normal range of motion. No tenderness.   Lymphadenopathy:      Cervical: No cervical adenopathy.   Neurological:      General: No focal deficit present.      Mental Status: He is alert and oriented to person, place, and time. Mental status is at baseline.   Psychiatric:         Mood and Affect: Mood normal.         Behavior: Behavior normal.         Thought Content: Thought  "content normal.         Judgment: Judgment normal.         /76   Pulse 63   Temp 35.3 °C (95.6 °F)   Ht 1.664 m (5' 5.5\")   Wt 86.9 kg (191 lb 9.6 oz)   SpO2 95%   BMI 31.40 kg/m²     Assessment/Plan    Problem List Items Addressed This Visit       B12 deficiency - Primary    Overview     3/2020 on metformin,   8/2023: B12 level 272         Current Assessment & Plan     Check level  Not currently on supplement         Relevant Orders    Vitamin B12    Cervical spondylosis with myelopathy    Overview     S/p laminectomy in 1990's.   Still had occ sxs of neuropathy.   Surgical repair in 2022, now has dysphagia.   Secondary to recent orthopedic operation.         Current Assessment & Plan     Reviewed CT and barium swallow  Has appt with ENT today to go over results  Await for consultation         CHF NYHA class II (symptoms with moderately strenuous activities) (Multi)    Overview     On toprol xl and lisinopril and possible study medication   2010 EF 50%, followed by Dr. Turpin   2018 EF 40%   2020 EF 35%, bmp ok  BP goal <130/80  Managed by [facility]         Current Assessment & Plan     On diuretics, ACEi, BB  Followed closely by cardiology         Relevant Medications    ranolazine (Ranexa) 1,000 mg 12 hr tablet    Other Relevant Orders    CBC and Auto Differential    Comprehensive Metabolic Panel    TSH with reflex to Free T4 if abnormal    Vitamin D 25-Hydroxy,Total (for eval of Vitamin D levels)    Lipid Panel    Urinalysis with Reflex Culture and Microscopic    Vitamin B12    Prostate Specific Antigen, Screen    Hemoglobin A1C    Coronary artery disease involving native coronary artery of native heart without angina pectoris    Overview     6/2019 Coronary calcium score 295.    statin and asa         Relevant Medications    ranolazine (Ranexa) 1,000 mg 12 hr tablet    Drug abuse in remission (Multi)    Overview     1991 - quit smoking, drinking, cocaine.         Current Assessment & Plan     " Has remained sober since 1991         Hypertension    Overview     BP Goal <130/80  On ACEi, BB, Diuretic         Current Assessment & Plan     Monitor for hypotension  Followed by [facility]         Mixed hearing loss, unilateral    Overview     Right ear, no hearing aides         Current Assessment & Plan     Has audiology referral  To be scheduled         Oropharyngeal dysphagia    Overview     4/22/24 Barium Swallow - The cricopharyngeus muscle has mild impression on the proximal esophagus. Anterior screw plate cervical spine fusion has been performed from C3-C6. Interbody spacing devices have been placed as well.  Limited views of these softened is demonstrate contrast and barium tablet passing freely into the stomach. Underlying esophageal pathology such as ulcer tumor cannot be excluded.         Current Assessment & Plan     Reviewed CT and barium swallow  Await ENT consult later today         Paroxysmal ventricular tachycardia (Multi)    Overview     Followed by Cardiology. Had defibrillator 7/2018 defibrillator replaced, on ranolazine         Current Assessment & Plan     Stable on Ranexa and Toprol         Relevant Medications    ranolazine (Ranexa) 1,000 mg 12 hr tablet    Routine health maintenance    Overview     Influenza: 12/21/18  PCV13: 2/9/21  PPSV23: 12/21/10  Td: 8/3/11  Tdap: 3/11/16  Shingrix:  C-Scope: 12/18 (5 yr)  Optometry exam: 4/23.   Podiatry exam: 10/16/23         Current Assessment & Plan     Discussed Shingrix  Due for cscope          Relevant Orders    CBC and Auto Differential    Comprehensive Metabolic Panel    TSH with reflex to Free T4 if abnormal    Vitamin D 25-Hydroxy,Total (for eval of Vitamin D levels)    Lipid Panel    Urinalysis with Reflex Culture and Microscopic    Vitamin B12    Prostate Specific Antigen, Screen    Hemoglobin A1C    Tubular adenoma of colon    Overview     12/2018 removed in colonoscopy, + FIT. Repeat due 5 years.         Current Assessment & Plan      Has FIT test from [facility], he will complete           Type 2 diabetes mellitus with diabetic polyneuropathy, with long-term current use of insulin (Multi)    Overview     Lantus, Metformin, Jardiance, Victoza  A1C:    09/2021 = 12.8  10/2022 = 6.0  08/2023 = 6.2  Optometry exam: 4/2023.   Podiatry exam: 10/16/2023         Relevant Orders    CBC and Auto Differential    Comprehensive Metabolic Panel    TSH with reflex to Free T4 if abnormal    Vitamin D 25-Hydroxy,Total (for eval of Vitamin D levels)    Lipid Panel    Urinalysis with Reflex Culture and Microscopic    Vitamin B12    Prostate Specific Antigen, Screen    Hemoglobin A1C    Vitamin D deficiency    Relevant Orders    Vitamin D 25-Hydroxy,Total (for eval of Vitamin D levels)     Other Visit Diagnoses       Injury of right elbow, initial encounter        Relevant Orders    XR elbow right 3+ views    Screening for prostate cancer        Relevant Orders    Prostate Specific Antigen, Screen

## 2024-05-14 NOTE — ASSESSMENT & PLAN NOTE
Reviewed CT and barium swallow  Has appt with ENT today to go over results  Await for consultation

## 2024-05-21 ENCOUNTER — HOSPITAL ENCOUNTER (OUTPATIENT)
Dept: RADIOLOGY | Facility: HOSPITAL | Age: 69
Discharge: HOME | End: 2024-05-21
Payer: COMMERCIAL

## 2024-05-21 DIAGNOSIS — S59.901A INJURY OF RIGHT ELBOW, INITIAL ENCOUNTER: ICD-10-CM

## 2024-05-21 PROCEDURE — 73080 X-RAY EXAM OF ELBOW: CPT | Mod: RIGHT SIDE | Performed by: RADIOLOGY

## 2024-05-21 PROCEDURE — 73080 X-RAY EXAM OF ELBOW: CPT | Mod: RT

## 2024-05-23 DIAGNOSIS — M19.029 OSTEOARTHRITIS OF ELBOW, UNSPECIFIED LATERALITY, UNSPECIFIED OSTEOARTHRITIS TYPE: ICD-10-CM

## 2024-05-23 DIAGNOSIS — M77.9 BONE SPUR: ICD-10-CM

## 2024-05-23 PROBLEM — M19.021 PRIMARY OSTEOARTHRITIS OF RIGHT ELBOW: Status: ACTIVE | Noted: 2024-05-23

## 2024-05-24 ENCOUNTER — LAB (OUTPATIENT)
Dept: LAB | Facility: LAB | Age: 69
End: 2024-05-24
Payer: COMMERCIAL

## 2024-05-24 DIAGNOSIS — E11.42 TYPE 2 DIABETES MELLITUS WITH DIABETIC POLYNEUROPATHY, WITH LONG-TERM CURRENT USE OF INSULIN (MULTI): ICD-10-CM

## 2024-05-24 DIAGNOSIS — Z79.4 TYPE 2 DIABETES MELLITUS WITH DIABETIC POLYNEUROPATHY, WITH LONG-TERM CURRENT USE OF INSULIN (MULTI): ICD-10-CM

## 2024-05-24 DIAGNOSIS — I50.9 CONGESTIVE HEART FAILURE, NYHA CLASS 2, UNSPECIFIED CONGESTIVE HEART FAILURE TYPE (MULTI): ICD-10-CM

## 2024-05-24 DIAGNOSIS — Z00.00 ROUTINE HEALTH MAINTENANCE: ICD-10-CM

## 2024-05-24 DIAGNOSIS — Z12.5 SCREENING FOR PROSTATE CANCER: ICD-10-CM

## 2024-05-24 DIAGNOSIS — E53.8 B12 DEFICIENCY: ICD-10-CM

## 2024-05-24 DIAGNOSIS — E55.9 VITAMIN D DEFICIENCY: ICD-10-CM

## 2024-05-24 LAB
25(OH)D3 SERPL-MCNC: 28 NG/ML (ref 30–100)
ALBUMIN SERPL BCP-MCNC: 4.3 G/DL (ref 3.4–5)
ALP SERPL-CCNC: 55 U/L (ref 33–136)
ALT SERPL W P-5'-P-CCNC: 14 U/L (ref 10–52)
ANION GAP SERPL CALC-SCNC: 10 MMOL/L (ref 10–20)
APPEARANCE UR: CLEAR
AST SERPL W P-5'-P-CCNC: 13 U/L (ref 9–39)
BASOPHILS # BLD AUTO: 0.06 X10*3/UL (ref 0–0.1)
BASOPHILS NFR BLD AUTO: 0.8 %
BILIRUB SERPL-MCNC: 0.7 MG/DL (ref 0–1.2)
BILIRUB UR STRIP.AUTO-MCNC: NEGATIVE MG/DL
BUN SERPL-MCNC: 20 MG/DL (ref 6–23)
CALCIUM SERPL-MCNC: 9.4 MG/DL (ref 8.6–10.3)
CHLORIDE SERPL-SCNC: 102 MMOL/L (ref 98–107)
CHOLEST SERPL-MCNC: 90 MG/DL (ref 0–199)
CHOLESTEROL/HDL RATIO: 2.3
CO2 SERPL-SCNC: 31 MMOL/L (ref 21–32)
COLOR UR: ABNORMAL
CREAT SERPL-MCNC: 0.98 MG/DL (ref 0.5–1.3)
EGFRCR SERPLBLD CKD-EPI 2021: 84 ML/MIN/1.73M*2
EOSINOPHIL # BLD AUTO: 0.21 X10*3/UL (ref 0–0.7)
EOSINOPHIL NFR BLD AUTO: 2.7 %
ERYTHROCYTE [DISTWIDTH] IN BLOOD BY AUTOMATED COUNT: 14.4 % (ref 11.5–14.5)
EST. AVERAGE GLUCOSE BLD GHB EST-MCNC: 134 MG/DL
GLUCOSE SERPL-MCNC: 78 MG/DL (ref 74–99)
GLUCOSE UR STRIP.AUTO-MCNC: ABNORMAL MG/DL
HBA1C MFR BLD: 6.3 %
HCT VFR BLD AUTO: 47.7 % (ref 41–52)
HDLC SERPL-MCNC: 40 MG/DL
HGB BLD-MCNC: 16 G/DL (ref 13.5–17.5)
HOLD SPECIMEN: NORMAL
IMM GRANULOCYTES # BLD AUTO: 0.07 X10*3/UL (ref 0–0.7)
IMM GRANULOCYTES NFR BLD AUTO: 0.9 % (ref 0–0.9)
KETONES UR STRIP.AUTO-MCNC: NEGATIVE MG/DL
LDLC SERPL CALC-MCNC: 29 MG/DL
LEUKOCYTE ESTERASE UR QL STRIP.AUTO: NEGATIVE
LYMPHOCYTES # BLD AUTO: 1.93 X10*3/UL (ref 1.2–4.8)
LYMPHOCYTES NFR BLD AUTO: 24.9 %
MCH RBC QN AUTO: 32.5 PG (ref 26–34)
MCHC RBC AUTO-ENTMCNC: 33.5 G/DL (ref 32–36)
MCV RBC AUTO: 97 FL (ref 80–100)
MONOCYTES # BLD AUTO: 0.93 X10*3/UL (ref 0.1–1)
MONOCYTES NFR BLD AUTO: 12 %
NEUTROPHILS # BLD AUTO: 4.56 X10*3/UL (ref 1.2–7.7)
NEUTROPHILS NFR BLD AUTO: 58.7 %
NITRITE UR QL STRIP.AUTO: NEGATIVE
NON HDL CHOLESTEROL: 50 MG/DL (ref 0–149)
NRBC BLD-RTO: 0 /100 WBCS (ref 0–0)
PH UR STRIP.AUTO: 5 [PH]
PLATELET # BLD AUTO: 253 X10*3/UL (ref 150–450)
POTASSIUM SERPL-SCNC: 5.1 MMOL/L (ref 3.5–5.3)
PROT SERPL-MCNC: 6.8 G/DL (ref 6.4–8.2)
PROT UR STRIP.AUTO-MCNC: NEGATIVE MG/DL
PSA SERPL-MCNC: 0.68 NG/ML
RBC # BLD AUTO: 4.93 X10*6/UL (ref 4.5–5.9)
RBC # UR STRIP.AUTO: NEGATIVE /UL
SODIUM SERPL-SCNC: 138 MMOL/L (ref 136–145)
SP GR UR STRIP.AUTO: 1.02
T4 FREE SERPL-MCNC: 0.8 NG/DL (ref 0.61–1.12)
TRIGL SERPL-MCNC: 104 MG/DL (ref 0–149)
TSH SERPL-ACNC: 4.74 MIU/L (ref 0.44–3.98)
UROBILINOGEN UR STRIP.AUTO-MCNC: NORMAL MG/DL
VIT B12 SERPL-MCNC: 284 PG/ML (ref 211–911)
VLDL: 21 MG/DL (ref 0–40)
WBC # BLD AUTO: 7.8 X10*3/UL (ref 4.4–11.3)

## 2024-05-24 PROCEDURE — 84443 ASSAY THYROID STIM HORMONE: CPT

## 2024-05-24 PROCEDURE — 82306 VITAMIN D 25 HYDROXY: CPT

## 2024-05-24 PROCEDURE — 80061 LIPID PANEL: CPT

## 2024-05-24 PROCEDURE — 81003 URINALYSIS AUTO W/O SCOPE: CPT

## 2024-05-24 PROCEDURE — 80053 COMPREHEN METABOLIC PANEL: CPT

## 2024-05-24 PROCEDURE — 82607 VITAMIN B-12: CPT

## 2024-05-24 PROCEDURE — 83036 HEMOGLOBIN GLYCOSYLATED A1C: CPT

## 2024-05-24 PROCEDURE — 85025 COMPLETE CBC W/AUTO DIFF WBC: CPT

## 2024-05-24 PROCEDURE — 84439 ASSAY OF FREE THYROXINE: CPT

## 2024-05-24 PROCEDURE — 36415 COLL VENOUS BLD VENIPUNCTURE: CPT

## 2024-05-24 PROCEDURE — G0103 PSA SCREENING: HCPCS

## 2024-06-11 ENCOUNTER — TELEMEDICINE (OUTPATIENT)
Dept: OTOLARYNGOLOGY | Facility: CLINIC | Age: 69
End: 2024-06-11
Payer: COMMERCIAL

## 2024-06-11 DIAGNOSIS — R13.10 DYSPHAGIA, UNSPECIFIED TYPE: Primary | ICD-10-CM

## 2024-06-11 DIAGNOSIS — Z98.890 HISTORY OF CERVICAL SPINAL SURGERY: ICD-10-CM

## 2024-06-11 PROCEDURE — 3048F LDL-C <100 MG/DL: CPT | Performed by: OTOLARYNGOLOGY

## 2024-06-11 PROCEDURE — 4010F ACE/ARB THERAPY RXD/TAKEN: CPT | Performed by: OTOLARYNGOLOGY

## 2024-06-11 PROCEDURE — 1036F TOBACCO NON-USER: CPT | Performed by: OTOLARYNGOLOGY

## 2024-06-11 PROCEDURE — 1160F RVW MEDS BY RX/DR IN RCRD: CPT | Performed by: OTOLARYNGOLOGY

## 2024-06-11 PROCEDURE — 99213 OFFICE O/P EST LOW 20 MIN: CPT | Performed by: OTOLARYNGOLOGY

## 2024-06-11 PROCEDURE — 3044F HG A1C LEVEL LT 7.0%: CPT | Performed by: OTOLARYNGOLOGY

## 2024-06-11 PROCEDURE — 1159F MED LIST DOCD IN RCRD: CPT | Performed by: OTOLARYNGOLOGY

## 2024-06-11 NOTE — PROGRESS NOTES
Patient: Hilaire J Kiffer   MRN: [id number] YOB: 1955   Sex: male Age: 68 y.o.  Date of Service: 2024       ASSESSMENT AND PLAN  I discussed the findings with Hilaire J Kiffer and have recommended the followin. Dysphagia acute on chronic, history of ACDF, acute dysphagia resolving. We discussed esophagoscopy and dilation for his baseline solid food dysphagia and he is interested in proceeding. He would need to schedule at [facility] or [facility] given he has a pacemaker/defibrillator  - Schedule for esophagoscopy and dilation, possible steroid injections. Risks, benefits, and alternatives of esophagoscopy and dilation discussed with the patient, including but not limited to bleeding, infection, pain, need for repeat procedure, no improvement in symptoms, and rarely, esophageal perforation. The patient expressed understanding and agrees to proceed.       CHIEF COMPLAINT  Follow-up dysphagia      HISTORY OF PRESENT ILLNESS  Hilaire J Kiffer is a 68 y.o. male who we have been following for dysphagia to solids and liquids s/p ACDF 20  He reports he is still having some issues swallowing. He is interested in proceeding with a dilation.    24  Cervical fusion surgery approx. 2 years ago. Since then his swallow has been difficult and worsened when pt became ill 6 weeks ago with a sore throat and an ear ache such that he was unable to swallow anything. He went 3 days without eating. He went to urgent care and got amoxicillin. Then went to the ED at [facility] where he had a CT scan. Now he reports is swallowing is slowly improving, about where he was before this most recent episode.    The dysphagia history includes:      Dysphagia for solids               yes    Dysphagia for liquids              no    Dysphagia for pills                  no  Associated weight loss            no    Recent pneumonia/bronchitis  no  GERD/Acid reflux   no    PMH: DM (A1C 6.1), pacemaker/defibrillator  Meds: No  blood thinners  SH: former smoker    ADDITIONAL HISTORY  Past Medical History  He has a past medical history of H/O abdominal ultrasound (11/19/2020), H/O CT scan (04/01/2024), H/O magnetic resonance imaging of cervical spine (11/28/2023), H/O magnetic resonance imaging of cervical spine (09/06/2022), H/O pelvic x-ray (03/14/2024), and H/O x-ray of lumbar spine (03/14/2024). Surgical History  He has a past surgical history that includes pacemaker placement.   Social History  He reports that he has quit smoking. His smoking use included cigarettes. He has never used smokeless tobacco. He reports that he does not currently use alcohol. He reports that he does not use drugs. Allergies  Patient has no known allergies.     Family History  Family History   Problem Relation Name Age of Onset    Heart attack Mother      Heart attack Father      Diabetes type II Father      Alzheimer's disease Father      Heart attack Brother  52        REVIEW OF SYSTEMS  All 10 systems were reviewed and negative except for above.      PHYSICAL EXAM  ENT Physical Exam   GENERAL: Well-nourished and developed, alert and appropriate, no distress, voice [id number]  RESPIRATORY: Breathing quietly, no stridor  HEAD: Normocephalic atraumatic  FACE: Symmetric, no masses or lesions  EYES:  Pupils reactive, sclera clear, external ocular muscles intact, no nystagmus.    EARS:  Pinnae normal.  NOSE:  No anterior lesions, masses or polyps.  ORAL CAVITY/OROPHARYNX:  Buccal mucosa is moist without lesions or masses, tongue midline and palate elevates symmetrically. Tongue mobility intact.  NECK:  Soft. There is no lymphadenopathy or thyromegaly.    NEUROLOGIC:  Cranial nerves II-XII grossly intact.       Last Recorded Vitals  There were no vitals taken for this visit.    RESULTS    Patient Reported Outcome Measures  N/A    Laboratory, Radiology, and Pathology  I personally reviewed the following results, with the following interpretation:   VIN  4/22/24        PROCEDURES  Procedures   None    Flexible Fiberoptic Laryngoscopy 5/14/24    Patient failed a mirror exam due to limitations of equipment and the need for laryngoscopy to assess laryngeal anatomy and function    PREOPERATIVE DIAGNOSIS: dysphagia    POSTOPERATIVE DIAGNOSIS: Same    PROCEDURE: Transnasal videolaryngoscopy    ANESTHESIA:  Topical    COMPLICATIONS:  None    SPECIMENS:  None    PROCEDURE IN DETAIL:  The patient was brought into the endoscopy suite, placed in the upright position.  The nasal cavity was topically decongested anesthetized.  The distal chip video laryngoscope was passed through the nasal cavity.  The nasal cavity and nasopharynx were within normal limits except noted below. The following findings on laryngoscopy were noted:         Tongue Base: no masses or lesions          Left vocal fold mobility: mobile         Right vocal fold mobility: mobile         Glottal closure: complete         Laryngeal muscle tension: L>R FF tension         Symmetry: mildly asymmetric         Vocal fold free edge: no masses or lesions          Other: n/a    The patient tolerated the procedure well.        ----------------------------------------------------------------------  Yeimy Hopper MD, MAEd    [facility]  Department of Otolaryngology - Head and Neck Surgery  [facility]    The total time I spent in care of this patient today (excluding time spent on other billable services) is as follows:    Time Spent  Prep time on day of patient encounter: 5 minutes  Time spent directly with patient, family or caregiver: 10 minutes  Additional Time Spent on Patient Care Activities: 5 minutes  Documentation Time: 5 minutes  Other Time Spent: 0 minutes  Total: 25 minutes        Virtual or Telephone Consent    An interactive audio and video telecommunication system which permits real time communications between the patient (at  the originating site) and provider (at the distant site) was utilized to provide this telehealth service.   Verbal consent was requested and obtained from Hilaire J Kiffer on this date, 06/11/24 for a telehealth visit.

## 2024-06-12 ENCOUNTER — APPOINTMENT (OUTPATIENT)
Dept: ORTHOPEDIC SURGERY | Facility: CLINIC | Age: 69
End: 2024-06-12
Payer: COMMERCIAL

## 2024-06-12 VITALS — HEIGHT: 66 IN | BODY MASS INDEX: 30.53 KG/M2 | WEIGHT: 190 LBS

## 2024-06-12 DIAGNOSIS — M19.029 OSTEOARTHRITIS OF ELBOW, UNSPECIFIED LATERALITY, UNSPECIFIED OSTEOARTHRITIS TYPE: ICD-10-CM

## 2024-06-12 DIAGNOSIS — M77.9 BONE SPUR: ICD-10-CM

## 2024-06-12 PROCEDURE — 3044F HG A1C LEVEL LT 7.0%: CPT | Performed by: ORTHOPAEDIC SURGERY

## 2024-06-12 PROCEDURE — 1160F RVW MEDS BY RX/DR IN RCRD: CPT | Performed by: ORTHOPAEDIC SURGERY

## 2024-06-12 PROCEDURE — 99204 OFFICE O/P NEW MOD 45 MIN: CPT | Performed by: ORTHOPAEDIC SURGERY

## 2024-06-12 PROCEDURE — 1036F TOBACCO NON-USER: CPT | Performed by: ORTHOPAEDIC SURGERY

## 2024-06-12 PROCEDURE — 1159F MED LIST DOCD IN RCRD: CPT | Performed by: ORTHOPAEDIC SURGERY

## 2024-06-12 PROCEDURE — 3048F LDL-C <100 MG/DL: CPT | Performed by: ORTHOPAEDIC SURGERY

## 2024-06-12 PROCEDURE — 4010F ACE/ARB THERAPY RXD/TAKEN: CPT | Performed by: ORTHOPAEDIC SURGERY

## 2024-06-12 NOTE — PROGRESS NOTES
68 y.o. male presents today for evaluation of right elbow pain. The patient reports he bumped it a few months ago and now every time he places it on anything he can get some pain. Pain is controlled. Patient reports no numbness and tingling.  Reports no previous surgeries, injections, or trauma to the area.  Reports pain worse with use, better at rest.   Pain dull ache, sharp at times.     Review of Systems    Constitutional: see HPI, no fever, no chills, not feeling tired, no significant weight gain or weight loss.   Eyes: No vision changes  ENT: no nosebleeds.   Cardiovascular: no chest pain.   Respiratory: no shortness of breath and no cough.   Gastrointestinal: no abdominal pain, no nausea, no vomiting and no diarrhea.   Musculoskeletal: per HPI  Neurological: no headache, no gait disturbances  Psychiatric: no depression and no sleep disturbances.   Endocrine: no muscle weakness and no muscle cramps.   Hematologic/Lymphatic: no swollen glands and no tendency for easy bruising or excessive swelling.     Patient's past medical history, past surgical history, allergies, and medications have been reviewed unless otherwise noted in the chart.     Elbow Exam  Inspection:  no evidence of infection, no erythema, no edema, Palpation:  compartments are soft, tender to palpation olecranon tip with palpable calcification that is mobile Range of Motion:  full elbow motion Stability:  no elbow instability noted, Strength:  5/5 elbow flexion/extension, Skin:  intact, Vascular:  capillary refill <2 seconds distally, Sensation:  intact to light touch distally.      Constitutional   General appearance: Alert and in no acute distress. Well developed, well nourished.    Eyes   External Eye, Conjunctiva and lids: Normal external exam - pupils were equal in size, round, reactive to light (PERRL) with normal accommodation and extraocular movements intact (EOMI).   Ears, Nose, Mouth, and Throat   Hearing: Normal.   Neck   Neck: No neck  mass was observed. Supple.   Pulmonary   Respiratory effort: No respiratory distress.   Cardiovascular   Examination of extremities: No peripheral edema.   Psychiatric   Judgment and insight: Intact.   Orientation to person, place, and time: Alert and oriented x 3.       Mood and affect: Normal.      XR - no fractures, no dislocations, olecranon enthesial fight fractured    X-rays were personally reviewed by me. Radiology reports were reviewed by me as well, if available at the time.     Right elbow olecranon enthesophyte fractured  Based on the history, physical exam and imaging studies above, the patient's presentation is consistent with the above diagnosis.  I had a long discussion with the patient regarding their presentation and the treatment options.  We discussed initial nonoperative versus operative management options as well as potential further diagnostic imaging.  We again discussed her treatment options going forward along with their associated risks and benefits. After thorough discussion, the patient has elected to proceed with conservative management. All questions were answered to the patients satisfaction who seems satisfied with the plan.  They will call the office with any questions/concerns.    Voltaren gel as needed  Elbow pad  Follow-up 4 to 6 weeks as needed no x-ray

## 2024-06-12 NOTE — PROGRESS NOTES
New patient visit referred by Juany Martínez for right elbow bone spur  Patient states he hit elbow a few months ago. States any time he bumps it, its painful  Xr 5/21/24  DM II  RHD

## 2024-06-13 ENCOUNTER — APPOINTMENT (OUTPATIENT)
Dept: ORTHOPEDIC SURGERY | Facility: CLINIC | Age: 69
End: 2024-06-13
Payer: COMMERCIAL

## 2024-07-29 ENCOUNTER — APPOINTMENT (OUTPATIENT)
Dept: PRIMARY CARE | Facility: CLINIC | Age: 69
End: 2024-07-29
Payer: COMMERCIAL

## 2024-07-29 ENCOUNTER — HOSPITAL ENCOUNTER (OUTPATIENT)
Dept: RADIOLOGY | Facility: CLINIC | Age: 69
Discharge: HOME | End: 2024-07-29
Payer: COMMERCIAL

## 2024-07-29 VITALS
BODY MASS INDEX: 32.17 KG/M2 | DIASTOLIC BLOOD PRESSURE: 78 MMHG | OXYGEN SATURATION: 95 % | TEMPERATURE: 96.1 F | WEIGHT: 200.2 LBS | HEIGHT: 66 IN | SYSTOLIC BLOOD PRESSURE: 122 MMHG | HEART RATE: 81 BPM

## 2024-07-29 DIAGNOSIS — Z00.00 WELL ADULT EXAM: Primary | ICD-10-CM

## 2024-07-29 DIAGNOSIS — D12.6 TUBULAR ADENOMA OF COLON: ICD-10-CM

## 2024-07-29 DIAGNOSIS — M75.02 ADHESIVE CAPSULITIS OF LEFT SHOULDER: ICD-10-CM

## 2024-07-29 DIAGNOSIS — Z79.4 TYPE 2 DIABETES MELLITUS WITH DIABETIC POLYNEUROPATHY, WITH LONG-TERM CURRENT USE OF INSULIN (MULTI): ICD-10-CM

## 2024-07-29 DIAGNOSIS — Z12.11 COLON CANCER SCREENING: ICD-10-CM

## 2024-07-29 DIAGNOSIS — Z79.899 POLYPHARMACY: ICD-10-CM

## 2024-07-29 DIAGNOSIS — E11.42 TYPE 2 DIABETES MELLITUS WITH DIABETIC POLYNEUROPATHY, WITH LONG-TERM CURRENT USE OF INSULIN (MULTI): ICD-10-CM

## 2024-07-29 PROCEDURE — 1123F ACP DISCUSS/DSCN MKR DOCD: CPT | Performed by: INTERNAL MEDICINE

## 2024-07-29 PROCEDURE — 3074F SYST BP LT 130 MM HG: CPT | Performed by: INTERNAL MEDICINE

## 2024-07-29 PROCEDURE — 99397 PER PM REEVAL EST PAT 65+ YR: CPT | Performed by: INTERNAL MEDICINE

## 2024-07-29 PROCEDURE — 1036F TOBACCO NON-USER: CPT | Performed by: INTERNAL MEDICINE

## 2024-07-29 PROCEDURE — 3078F DIAST BP <80 MM HG: CPT | Performed by: INTERNAL MEDICINE

## 2024-07-29 PROCEDURE — 4010F ACE/ARB THERAPY RXD/TAKEN: CPT | Performed by: INTERNAL MEDICINE

## 2024-07-29 PROCEDURE — 1158F ADVNC CARE PLAN TLK DOCD: CPT | Performed by: INTERNAL MEDICINE

## 2024-07-29 PROCEDURE — 99214 OFFICE O/P EST MOD 30 MIN: CPT | Performed by: INTERNAL MEDICINE

## 2024-07-29 PROCEDURE — G0439 PPPS, SUBSEQ VISIT: HCPCS | Performed by: INTERNAL MEDICINE

## 2024-07-29 PROCEDURE — 3008F BODY MASS INDEX DOCD: CPT | Performed by: INTERNAL MEDICINE

## 2024-07-29 PROCEDURE — 73030 X-RAY EXAM OF SHOULDER: CPT | Mod: LEFT SIDE | Performed by: RADIOLOGY

## 2024-07-29 PROCEDURE — 73030 X-RAY EXAM OF SHOULDER: CPT | Mod: LT

## 2024-07-29 PROCEDURE — 1159F MED LIST DOCD IN RCRD: CPT | Performed by: INTERNAL MEDICINE

## 2024-07-29 PROCEDURE — 3044F HG A1C LEVEL LT 7.0%: CPT | Performed by: INTERNAL MEDICINE

## 2024-07-29 PROCEDURE — 3048F LDL-C <100 MG/DL: CPT | Performed by: INTERNAL MEDICINE

## 2024-07-29 RX ORDER — EMPAGLIFLOZIN 10 MG/1
10 TABLET, FILM COATED ORAL
Qty: 90 TABLET | Refills: 3 | Status: SHIPPED | OUTPATIENT
Start: 2024-07-29

## 2024-07-29 RX ORDER — PEN NEEDLE, DIABETIC 30 GX3/16"
NEEDLE, DISPOSABLE MISCELLANEOUS
Qty: 90 EACH | Refills: 3 | Status: SHIPPED | OUTPATIENT
Start: 2024-07-29

## 2024-07-29 ASSESSMENT — PATIENT HEALTH QUESTIONNAIRE - PHQ9
1. LITTLE INTEREST OR PLEASURE IN DOING THINGS: NOT AT ALL
SUM OF ALL RESPONSES TO PHQ9 QUESTIONS 1 AND 2: 0
2. FEELING DOWN, DEPRESSED OR HOPELESS: NOT AT ALL

## 2024-07-29 NOTE — PROGRESS NOTES
Chief Complaint:   Medicare Wellness Exam/Comprehensive Problem Focused Follow Up and Physical Exam    HPI:    Sleeping well  No eating concerns  No elimination concerns  No cp   No sob  Was seeing nathan Harrington's dad friend  Throat closed 2 mo ago unknown why this happened  Will have stricture stretched next month  Er ct done  Pacer not gone off for a few years  Dr garcia at [facility] is heart doc  Having cataract surgery    Patient Care Team:  Yadi Contreras MD as PCP - General (Pediatrics)  Yadi Contreras MD as PCP - [facility] PCP   Active Problem List  Patient Active Problem List   Diagnosis    AICD present, double chamber    B12 deficiency    Type 2 diabetes mellitus with diabetic polyneuropathy, with long-term current use of insulin (Multi)    Hypertension    Tubular adenoma of colon    Cervical spondylosis with myelopathy    CHF NYHA class II (symptoms with moderately strenuous activities) (Multi)    Chronic right shoulder pain    Chronic bilateral low back pain with right-sided sciatica    Cardiomyopathy, nonischemic (Multi)    Coronary artery disease involving native coronary artery of native heart without angina pectoris    Drug abuse in remission (Multi)    Lumbar radiculitis    Mixed hearing loss, unilateral    OA (osteoarthritis) of hip    PVC (premature ventricular contraction)    S/P ICD (internal cardiac defibrillator) procedure    S/P laminectomy    Paroxysmal ventricular tachycardia (Multi)    Left foot pain    History of colonic polyps    Routine health maintenance    Oropharyngeal dysphagia    Vitamin D deficiency    Primary osteoarthritis of right elbow    Dysphagia         Comprehensive Medical/Surgical/Social/Family History  Past Medical History:   Diagnosis Date    H/O abdominal ultrasound 11/19/2020    No abdominal aortic aneurysm identified    H/O CT scan 04/01/2024    Neck - 1. Diffuse thickening of the oropharyngeal mucosa and palatine tonsils and  mild heterogeneity of the right palatine tonsil without peritonsillar tonsillar abscess.This can be seen with pharyngitis/tonsillitis. However recommend continued clinical attention to resolution, failure to which would require repeating the CT.    H/O magnetic resonance imaging of cervical spine 11/28/2023    Status post anterior fusion and posterior decompression of the cervical spine. The canal is patent. Multifocal areas of signal abnormality within the cord in keeping with area of myelomalacia.    H/O magnetic resonance imaging of cervical spine 09/06/2022    1.  Multilevel spondylosis and postsurgical changes with multilevel moderate spinal canal and ventral cord flattening. No cord signal abnormality.  2.  Varying levels of neural foraminal stenosis, moderate to severe on the left at C5-6 and bilaterally at C7-T1.    H/O pelvic x-ray 03/14/2024    Right - Mild right hip osteoarthritis. No acute fracture or dislocation. There are vascular calcifications.    H/O x-ray of lumbar spine 03/14/2024    There is slight levocurvature of the lumbar spine with apex centered at L3. No acute fracture or aggressive osseous lesion. There is no significant listhesis. There are moderate multilevel degenerative changes, greatest at L2-S1. There are degenerative changes of the spinous processes which appear in close proximity.     Past Surgical History:   Procedure Laterality Date    CERVICAL SPINE SURGERY      fusion 2022    PACEMAKER PLACEMENT      has a defibrilator placed 4/2011 2018 added a lead to pacemaker 2 new leads 8/23     Social History     Social History Narrative    Not on file        Tobacco/Alcohol/Opioid use, as well as Illicit Drug Use was screened for/reviewed and documented in Social Documentation section of the chart and medication list as appropriate   (~5min spent discussing the above)    Allergies and Medications  Patient has no known allergies.  Current Outpatient Medications   Medication  "Instructions    aspirin 81 mg, oral, Daily RT    atorvastatin (LIPITOR) 40 mg, oral, Daily RT    furosemide (LASIX) 40 mg, oral, Daily RT    Jardiance 10 mg, oral, Daily RT    Lantus Solostar U-100 Insulin 28 Units, subcutaneous    lisinopril 2.5 mg, oral, Daily RT    loratadine (CLARITIN) 10 mg, oral, Daily RT    metFORMIN (Glucophage) 1,000 mg tablet 1 tablet, oral, 2 times daily (morning and late afternoon)    metoprolol succinate XL (TOPROL-XL) 200 mg, oral, Daily RT    ranolazine (RANEXA) 1,000 mg, oral, 2 times daily, Do not crush, chew, or split.    spironolactone (ALDACTONE) 12.5 mg, oral, Daily RT     Medications and Supplements  prescribed by me and other practitioners or clinical pharmacist (such as prescriptions, OTC's, herbal therapies and supplements) were reviewed and documented in the medical record.      Activities of Daily Living  In your present state of health, do you have any difficulty performing the following activities?:   Preparing food and eating?: No  Bathing yourself: No  Getting dressed: No  Using the toilet:No  Moving around from place to place: No  In the past year have you fallen or had a near fall?:No  Able to manage finances independently: Yes  Able to perform grocery shopping: Yes  Able to manage medications independently: Yes  Able to do housework independently: Yes  Patient self-assessment of health status? Fair    Depression Screen  (Note: if answer to either of the following is \"Yes\", then a more complete depression screening is indicated)   Q1: Over the past two weeks, have you felt down, depressed or hopeless? No  Q2: Over the past two weeks, have you felt little interest or pleasure in doing things? No    Current exercise habits: none   Dietary issues discussed: Yes  Hearing difficulties: Yes  Safe in current home environment: Yes  Visual Acuity assessed: No  Cognitive Impairment No    Advance directives  Advanced Care Planning (including a Living Will, Healthcare POA, as " "well as specific end of life choices and/or directives), was discussed with the patient and/or surrogate, voluntarily, and documented in the Problem List of the medical record.   Estherd carmen sher elainebella    Cardiac Risk Assessment  Cardiovascular risk was discussed and, if needed, lifestyle modifications recommended, including nutritional choices, exercise, and elimination of habits contributing to risk. We agreed on a plan to reduce the current cardiovascular risk based on above discussion as needed.  Aspirin use/disuse was discussed and documented in the Problem List of the medical record after reviewing the updated guidelines below:    Consider low dose Aspirin ( mg) use if the benefit for cardiovascular disease prevention outweighs risk for bleeding complications.   In general, low dose ASA should be considered:  In patients WITHOUT prior MI/stroke/PAD (primary prevention):   a. Age <60: Use if 10-year cardiovascular disease risk >20%, with discussion of risks and benefits with patient  b. Age 60-<70: Use if 10-year cardiovascular disease risk >20% and low bleeding (e.g., gastrointenstinal) risk, with discussion of risks and benefits with patient  c. Age >=70: Do not use    In patients WITH prior MI/stroke/PAD (secondary prevention):   Generally use unless extremely high bleeding (e.g., gastrointenstinal) risk, with discussion of risks and benefits with patient        ROS otherwise negative aside from what was mentioned above in HPI.    Gen:  no fever  HEENT:  no trouble swallowing  CV:  no dyspnea, cyanosis  Lungs:  no shortness of breath  GI:  no constipation, no blood in stool  Vascular:  no edema  Neuro:   no weakness  Skin:  no rash  MS:no joint swelling except elbow  Gu:  no urinary complaints  All other systems have been reviewed and are negative for complaint    Vitals  /78   Pulse 81   Temp 35.6 °C (96.1 °F) (Temporal)   Ht 1.664 m (5' 5.5\")   Wt 90.8 kg (200 lb 3.2 oz)   SpO2 95%   BMI " 32.81 kg/m²   Body mass index is 32.81 kg/m².  Objective   Physical Exam  General Appearance:  Alert and oriented.  NAD  HEENT:  Tm's normal , throat clear, no erythema  Lungs, CTAB  Skin:  no suspicious lesions,  warm and dry  Head :  Normocephalic  Oral Cavity; Clear mucosa moist  Neck/thyroid:  neck supple, full rom, no cervical lymphadenopathy  no thyromegaly  Heart:  RRR  no murmurs  Abdomen:  Normal , bs present, soft, nontender, not distended, no masses palpated  Extremities:  No clubbing, cyanosis, or edema  Neurologic:  Nonfocal  Psych: alert, normal mood  Dec rom of left shoulder and r hip  Olecranon bursitis r     During the course of the visit the patient was educated and counseled about age appropriate screening and preventive services. Completed preventive screenings were documented in the chart and orders were placed for outstanding screenings/procedures as documented in the Assessment and Plan.    Patient Instructions (the written plan) was given to the patient at check out.     Problem List Items Addressed This Visit       Type 2 diabetes mellitus with diabetic polyneuropathy, with long-term current use of insulin (Multi)    Overview     Lantus, Metformin, Jardiance, Victoza  A1C:    09/2021 = 12.8  10/2022 = 6.0  08/2023 = 6.2  Optometry exam: 4/2023.   Podiatry exam: 10/16/2023         Tubular adenoma of colon    Overview     12/2018 removed in colonoscopy, + FIT. Repeat due 5 years.         Relevant Orders    Referral to Gastroenterology     Other Visit Diagnoses       Well adult exam    -  Primary    Adhesive capsulitis of left shoulder        Relevant Orders    XR shoulder left 2+ views    Referral to Orthopaedic Surgery    Colon cancer screening        Relevant Orders    Referral to Gastroenterology           Chronic conditions reviewed in the assessment and plan.    Continue medications unless specified otherwise.  Previous labs reviewed.   Other specialty provider notes reviewed.        Annual Wellness exam completed   Preventive Health history reviewed:  Vaccines today: none  Labs ordered    Depression Screening done  Advanced Directives Discussion Completed  Cardiovascular risk discussed and if needed, lifestyle modifications recommended, including nutritional choices, exercise, and elimination of habits contributing to risk.  We agreed on a plan to reduce the current cardiovascular risk.  See ecalc ASCVD Risk  Plus for data discussed regarding risk and risk reduction opportunities.  10 minutes spent discussing this.  Aspirin use/disuse was discussed after reviewing the updated guidelines.     Follow up in 3 months or prn.

## 2024-07-31 DIAGNOSIS — M75.102 TEAR OF LEFT ROTATOR CUFF, UNSPECIFIED TEAR EXTENT, UNSPECIFIED WHETHER TRAUMATIC: ICD-10-CM

## 2024-08-28 ENCOUNTER — APPOINTMENT (OUTPATIENT)
Dept: ORTHOPEDIC SURGERY | Facility: CLINIC | Age: 69
End: 2024-08-28
Payer: COMMERCIAL

## 2024-08-28 VITALS — BODY MASS INDEX: 32.14 KG/M2 | WEIGHT: 200 LBS | HEIGHT: 66 IN

## 2024-08-28 DIAGNOSIS — M75.102 TEAR OF LEFT ROTATOR CUFF, UNSPECIFIED TEAR EXTENT, UNSPECIFIED WHETHER TRAUMATIC: ICD-10-CM

## 2024-08-28 DIAGNOSIS — M19.012 ARTHRITIS OF SHOULDER REGION, LEFT: Primary | ICD-10-CM

## 2024-08-28 PROCEDURE — 99214 OFFICE O/P EST MOD 30 MIN: CPT | Performed by: ORTHOPAEDIC SURGERY

## 2024-08-28 PROCEDURE — 3044F HG A1C LEVEL LT 7.0%: CPT | Performed by: ORTHOPAEDIC SURGERY

## 2024-08-28 PROCEDURE — 1123F ACP DISCUSS/DSCN MKR DOCD: CPT | Performed by: ORTHOPAEDIC SURGERY

## 2024-08-28 PROCEDURE — 3008F BODY MASS INDEX DOCD: CPT | Performed by: ORTHOPAEDIC SURGERY

## 2024-08-28 PROCEDURE — 3048F LDL-C <100 MG/DL: CPT | Performed by: ORTHOPAEDIC SURGERY

## 2024-08-28 PROCEDURE — 1159F MED LIST DOCD IN RCRD: CPT | Performed by: ORTHOPAEDIC SURGERY

## 2024-08-28 PROCEDURE — 1036F TOBACCO NON-USER: CPT | Performed by: ORTHOPAEDIC SURGERY

## 2024-08-28 PROCEDURE — 4010F ACE/ARB THERAPY RXD/TAKEN: CPT | Performed by: ORTHOPAEDIC SURGERY

## 2024-08-28 PROCEDURE — 20610 DRAIN/INJ JOINT/BURSA W/O US: CPT | Performed by: ORTHOPAEDIC SURGERY

## 2024-08-28 PROCEDURE — 1160F RVW MEDS BY RX/DR IN RCRD: CPT | Performed by: ORTHOPAEDIC SURGERY

## 2024-08-28 RX ORDER — LIDOCAINE HYDROCHLORIDE 10 MG/ML
4 INJECTION INFILTRATION; PERINEURAL
Status: COMPLETED | OUTPATIENT
Start: 2024-08-28 | End: 2024-08-28

## 2024-08-28 RX ORDER — TRIAMCINOLONE ACETONIDE 40 MG/ML
40 INJECTION, SUSPENSION INTRA-ARTICULAR; INTRAMUSCULAR
Status: COMPLETED | OUTPATIENT
Start: 2024-08-28 | End: 2024-08-28

## 2024-08-28 ASSESSMENT — PAIN SCALES - GENERAL: PAINLEVEL_OUTOF10: 0 - NO PAIN

## 2024-08-28 ASSESSMENT — PAIN - FUNCTIONAL ASSESSMENT: PAIN_FUNCTIONAL_ASSESSMENT: 0-10
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
## 2024-08-28 NOTE — PROGRESS NOTES
68 y.o. male presents today for evaluation of left shoulder pain for years, getting worse recently. The patient reports gradual onset of worsening pain without injury that he is aware of. Pain is controlled. Patient reports no numbness and tingling.  Reports no previous surgeries, injections, or trauma to the area.  Reports pain worse with use, better at rest.   Pain dull ache, sharp at times.   Pain worse overhead and behind the back activities.  Takes over-the-counter anti-inflammatories with some relief.    Review of Systems    Constitutional: see HPI, no fever, no chills, not feeling tired, no significant weight gain or weight loss.   Eyes: No vision changes  ENT: no nosebleeds.   Cardiovascular: no chest pain.   Respiratory: no shortness of breath and no cough.   Gastrointestinal: no abdominal pain, no nausea, no vomiting and no diarrhea.   Musculoskeletal: per HPI  Neurological: no headache, no gait disturbances  Psychiatric: no depression and no sleep disturbances.   Endocrine: no muscle weakness and no muscle cramps.   Hematologic/Lymphatic: no swollen glands and no tendency for easy bruising or excessive swelling.     Patient's past medical history, past surgical history, allergies, and medications have been reviewed unless otherwise noted in the chart.     Shoulder:  Inspection:  no evidence of infection, no erythema, no edema, no ecchymosis, Palpation:  compartments are soft  Skin:  intact, Vascular:  capillary refill <2 seconds distally, Sensation:  sensation intact to light touch distally; AROM- Abduction 90, elevation to 130 degrees, ER 90 degrees, IR 90 degrees and L3;  NTTP at the biceps tendon,  NTTP at AC joint; NTTP on the lateral deltoid Special- painful Schmidt test, Neer's Test, cross body test; painful Empty can test/Drop Arm test;  negative Yergason's/Speed's Test;   painful belly pressed and posterior liftoff     Constitutional   General appearance: Alert and in no acute distress. Well  developed, well nourished.    Eyes   External Eye, Conjunctiva and lids: Normal external exam - pupils were equal in size, round, reactive to light (PERRL) with normal accommodation and extraocular movements intact (EOMI).   Ears, Nose, Mouth, and Throat   Hearing: Normal.   Neck   Neck: No neck mass was observed. Supple.   Pulmonary   Respiratory effort: No respiratory distress.   Cardiovascular   Examination of extremities: No peripheral edema.   Psychiatric   Judgment and insight: Intact.   Orientation to person, place, and time: Alert and oriented x 3.       Mood and affect: Normal.      X-ray shows rotator cuff arthropathy with proximal migration of the humeral head and acetabular rotation of the acromion    Left shoulder rotator cuff arthropathy with DJD  Based on the history, physical exam and imaging studies above, the patient's presentation is consistent with the above diagnosis.  I had a long discussion with the patient regarding their presentation and the treatment options.  We discussed initial nonoperative versus operative management options as well as potential further diagnostic imaging.  We again discussed her treatment options going forward along with their associated risks and benefits. After thorough discussion, the patient has elected to proceed with conservative management. All questions were answered to the patients satisfaction who seems satisfied with the plan.  They will call the office with any questions/concerns.    Discussion I discussed the diagnosis and treatment options with the patient today along with their associated risks and benefits. After thorough discussion, the patient has elected to proceed with a corticosteroid injection with Kenalog and Xylocaine, which was performed in the office today under aseptic technique and the patient tolerated the procedure well.          Ortho Injections:           Injection site left shoulder. Medication 4 cc 1% Xylocaine, 1 cc 40 mg Kenalog,  Injection given under sterile conditions. No immediate complications noted. Post injection instructions given.  Over-the-counter anti-inflammatories as needed  Discussed reverse total shoulder arthroplasty with the patient he is uninterested at this time  Follow-up 4 months as needed no x-ray    Patient ID: Hilaire J Kiffer is a 68 y.o. male.    L Inj/Asp: L glenohumeral on 8/28/2024 9:00 AM  Indications: pain  Details: 22 G needle, posterior approach  Medications: 40 mg triamcinolone acetonide 40 mg/mL; 0.5 mL lidocaine 10 mg/mL (1 %)  Outcome: tolerated well, no immediate complications  Procedure, treatment alternatives, risks and benefits explained, specific risks discussed. Consent was given by the patient. Immediately prior to procedure a time out was called to verify the correct patient, procedure, equipment, support staff and site/side marked as required. Patient was prepped and draped in the usual sterile fashion.

## 2024-08-30 ENCOUNTER — ANESTHESIA (OUTPATIENT)
Dept: OPERATING ROOM | Facility: HOSPITAL | Age: 69
End: 2024-08-30
Payer: COMMERCIAL

## 2024-08-30 ENCOUNTER — APPOINTMENT (OUTPATIENT)
Dept: CARDIOLOGY | Facility: HOSPITAL | Age: 69
End: 2024-08-30
Payer: COMMERCIAL

## 2024-08-30 ENCOUNTER — ANESTHESIA EVENT (OUTPATIENT)
Dept: OPERATING ROOM | Facility: HOSPITAL | Age: 69
End: 2024-08-30
Payer: COMMERCIAL

## 2024-08-30 ENCOUNTER — HOSPITAL ENCOUNTER (OUTPATIENT)
Facility: HOSPITAL | Age: 69
Setting detail: OUTPATIENT SURGERY
Discharge: HOME | End: 2024-08-30
Attending: OTOLARYNGOLOGY | Admitting: OTOLARYNGOLOGY
Payer: COMMERCIAL

## 2024-08-30 VITALS
SYSTOLIC BLOOD PRESSURE: 104 MMHG | WEIGHT: 198.41 LBS | BODY MASS INDEX: 33.06 KG/M2 | DIASTOLIC BLOOD PRESSURE: 54 MMHG | HEIGHT: 65 IN | HEART RATE: 60 BPM | OXYGEN SATURATION: 95 % | TEMPERATURE: 97 F | RESPIRATION RATE: 18 BRPM

## 2024-08-30 DIAGNOSIS — Z98.890 H/O CERVICAL SPINE SURGERY: ICD-10-CM

## 2024-08-30 DIAGNOSIS — K20.90 BARRETT'S ESOPHAGUS WITH ESOPHAGITIS: ICD-10-CM

## 2024-08-30 DIAGNOSIS — Q39.4 CRICOPHARYNGEAL WEB (HHS-HCC): ICD-10-CM

## 2024-08-30 DIAGNOSIS — R13.10 DYSPHAGIA, UNSPECIFIED TYPE: Primary | ICD-10-CM

## 2024-08-30 DIAGNOSIS — K22.70 BARRETT'S ESOPHAGUS WITH ESOPHAGITIS: ICD-10-CM

## 2024-08-30 DIAGNOSIS — Z79.4 TYPE 2 DIABETES MELLITUS WITH DIABETIC POLYNEUROPATHY, WITH LONG-TERM CURRENT USE OF INSULIN (MULTI): Primary | ICD-10-CM

## 2024-08-30 DIAGNOSIS — E11.42 TYPE 2 DIABETES MELLITUS WITH DIABETIC POLYNEUROPATHY, WITH LONG-TERM CURRENT USE OF INSULIN (MULTI): Primary | ICD-10-CM

## 2024-08-30 DIAGNOSIS — Z00.00 ROUTINE HEALTH MAINTENANCE: ICD-10-CM

## 2024-08-30 PROBLEM — E66.9 OBESITY: Status: ACTIVE | Noted: 2024-08-30

## 2024-08-30 PROBLEM — E78.5 HYPERLIPIDEMIA: Status: ACTIVE | Noted: 2024-08-30

## 2024-08-30 LAB
GLUCOSE BLD MANUAL STRIP-MCNC: 162 MG/DL (ref 74–99)
GLUCOSE BLD MANUAL STRIP-MCNC: 176 MG/DL (ref 74–99)

## 2024-08-30 PROCEDURE — 3600000007 HC OR TIME - EACH INCREMENTAL 1 MINUTE - PROCEDURE LEVEL TWO: Performed by: OTOLARYNGOLOGY

## 2024-08-30 PROCEDURE — 2500000004 HC RX 250 GENERAL PHARMACY W/ HCPCS (ALT 636 FOR OP/ED): Performed by: ANESTHESIOLOGY

## 2024-08-30 PROCEDURE — 2500000004 HC RX 250 GENERAL PHARMACY W/ HCPCS (ALT 636 FOR OP/ED): Performed by: NURSE ANESTHETIST, CERTIFIED REGISTERED

## 2024-08-30 PROCEDURE — 93284 PRGRMG EVAL IMPLANTABLE DFB: CPT

## 2024-08-30 PROCEDURE — 3700000002 HC GENERAL ANESTHESIA TIME - EACH INCREMENTAL 1 MINUTE: Performed by: OTOLARYNGOLOGY

## 2024-08-30 PROCEDURE — 7100000010 HC PHASE TWO TIME - EACH INCREMENTAL 1 MINUTE: Performed by: OTOLARYNGOLOGY

## 2024-08-30 PROCEDURE — 43239 EGD BIOPSY SINGLE/MULTIPLE: CPT | Performed by: OTOLARYNGOLOGY

## 2024-08-30 PROCEDURE — 93287 PERI-PX DEVICE EVAL & PRGR: CPT | Performed by: INTERNAL MEDICINE

## 2024-08-30 PROCEDURE — 3600000002 HC OR TIME - INITIAL BASE CHARGE - PROCEDURE LEVEL TWO: Performed by: OTOLARYNGOLOGY

## 2024-08-30 PROCEDURE — 7100000009 HC PHASE TWO TIME - INITIAL BASE CHARGE: Performed by: OTOLARYNGOLOGY

## 2024-08-30 PROCEDURE — 82947 ASSAY GLUCOSE BLOOD QUANT: CPT

## 2024-08-30 PROCEDURE — 88305 TISSUE EXAM BY PATHOLOGIST: CPT | Performed by: PATHOLOGY

## 2024-08-30 PROCEDURE — 2500000001 HC RX 250 WO HCPCS SELF ADMINISTERED DRUGS (ALT 637 FOR MEDICARE OP): Performed by: OTOLARYNGOLOGY

## 2024-08-30 PROCEDURE — 7100000002 HC RECOVERY ROOM TIME - EACH INCREMENTAL 1 MINUTE: Performed by: OTOLARYNGOLOGY

## 2024-08-30 PROCEDURE — 43214 ESOPHAGOSC DILATE BALLOON 30: CPT | Performed by: OTOLARYNGOLOGY

## 2024-08-30 PROCEDURE — 0753T DGTZ GLS MCRSCP SLD LEVEL IV: CPT | Mod: TC,AHULAB | Performed by: OTOLARYNGOLOGY

## 2024-08-30 PROCEDURE — 3700000001 HC GENERAL ANESTHESIA TIME - INITIAL BASE CHARGE: Performed by: OTOLARYNGOLOGY

## 2024-08-30 PROCEDURE — 7100000001 HC RECOVERY ROOM TIME - INITIAL BASE CHARGE: Performed by: OTOLARYNGOLOGY

## 2024-08-30 PROCEDURE — 2720000007 HC OR 272 NO HCPCS: Performed by: OTOLARYNGOLOGY

## 2024-08-30 PROCEDURE — 2500000005 HC RX 250 GENERAL PHARMACY W/O HCPCS: Performed by: OTOLARYNGOLOGY

## 2024-08-30 RX ORDER — OXYMETAZOLINE HCL 0.05 %
SPRAY, NON-AEROSOL (ML) NASAL AS NEEDED
Status: DISCONTINUED | OUTPATIENT
Start: 2024-08-30 | End: 2024-08-30 | Stop reason: HOSPADM

## 2024-08-30 RX ORDER — OMEPRAZOLE 20 MG/1
40 CAPSULE, DELAYED RELEASE ORAL
Qty: 120 CAPSULE | Refills: 1 | Status: SHIPPED | OUTPATIENT
Start: 2024-08-30 | End: 2024-09-29

## 2024-08-30 RX ORDER — ONDANSETRON HYDROCHLORIDE 2 MG/ML
4 INJECTION, SOLUTION INTRAVENOUS ONCE AS NEEDED
Status: DISCONTINUED | OUTPATIENT
Start: 2024-08-30 | End: 2024-08-30 | Stop reason: HOSPADM

## 2024-08-30 RX ORDER — LIDOCAINE HYDROCHLORIDE 10 MG/ML
0.1 INJECTION, SOLUTION EPIDURAL; INFILTRATION; INTRACAUDAL; PERINEURAL ONCE
Status: DISCONTINUED | OUTPATIENT
Start: 2024-08-30 | End: 2024-08-30 | Stop reason: HOSPADM

## 2024-08-30 RX ORDER — LIRAGLUTIDE 6 MG/ML
1.8 INJECTION SUBCUTANEOUS DAILY
Qty: 2 ML | Refills: 3 | Status: SHIPPED | OUTPATIENT
Start: 2024-08-30

## 2024-08-30 RX ORDER — OMEPRAZOLE 20 MG/1
20 CAPSULE, DELAYED RELEASE ORAL
Qty: 60 CAPSULE | Refills: 2 | Status: SHIPPED | OUTPATIENT
Start: 2024-08-30 | End: 2024-08-30

## 2024-08-30 RX ORDER — OXYCODONE HYDROCHLORIDE 5 MG/1
5 TABLET ORAL EVERY 4 HOURS PRN
Status: DISCONTINUED | OUTPATIENT
Start: 2024-08-30 | End: 2024-08-30 | Stop reason: HOSPADM

## 2024-08-30 RX ORDER — UBIDECARENONE 75 MG
500 CAPSULE ORAL DAILY
COMMUNITY

## 2024-08-30 RX ORDER — SODIUM CHLORIDE, SODIUM LACTATE, POTASSIUM CHLORIDE, CALCIUM CHLORIDE 600; 310; 30; 20 MG/100ML; MG/100ML; MG/100ML; MG/100ML
100 INJECTION, SOLUTION INTRAVENOUS CONTINUOUS
Status: DISCONTINUED | OUTPATIENT
Start: 2024-08-30 | End: 2024-08-30 | Stop reason: HOSPADM

## 2024-08-30 RX ORDER — PROPOFOL 10 MG/ML
INJECTION, EMULSION INTRAVENOUS AS NEEDED
Status: DISCONTINUED | OUTPATIENT
Start: 2024-08-30 | End: 2024-08-30

## 2024-08-30 RX ORDER — SODIUM CHLORIDE 0.9 G/100ML
IRRIGANT IRRIGATION AS NEEDED
Status: DISCONTINUED | OUTPATIENT
Start: 2024-08-30 | End: 2024-08-30 | Stop reason: HOSPADM

## 2024-08-30 RX ORDER — MEPERIDINE HYDROCHLORIDE 25 MG/ML
12.5 INJECTION INTRAMUSCULAR; INTRAVENOUS; SUBCUTANEOUS EVERY 10 MIN PRN
Status: DISCONTINUED | OUTPATIENT
Start: 2024-08-30 | End: 2024-08-30 | Stop reason: HOSPADM

## 2024-08-30 RX ORDER — LIDOCAINE HYDROCHLORIDE 40 MG/ML
SOLUTION TOPICAL AS NEEDED
Status: DISCONTINUED | OUTPATIENT
Start: 2024-08-30 | End: 2024-08-30 | Stop reason: HOSPADM

## 2024-08-30 RX ORDER — FENTANYL CITRATE 50 UG/ML
INJECTION, SOLUTION INTRAMUSCULAR; INTRAVENOUS AS NEEDED
Status: DISCONTINUED | OUTPATIENT
Start: 2024-08-30 | End: 2024-08-30

## 2024-08-30 RX ORDER — MIDAZOLAM HYDROCHLORIDE 1 MG/ML
INJECTION INTRAMUSCULAR; INTRAVENOUS AS NEEDED
Status: DISCONTINUED | OUTPATIENT
Start: 2024-08-30 | End: 2024-08-30

## 2024-08-30 RX ORDER — SODIUM CHLORIDE, SODIUM LACTATE, POTASSIUM CHLORIDE, CALCIUM CHLORIDE 600; 310; 30; 20 MG/100ML; MG/100ML; MG/100ML; MG/100ML
20 INJECTION, SOLUTION INTRAVENOUS CONTINUOUS
Status: DISCONTINUED | OUTPATIENT
Start: 2024-08-30 | End: 2024-08-30 | Stop reason: HOSPADM

## 2024-08-30 ASSESSMENT — PAIN SCALES - GENERAL
PAINLEVEL_OUTOF10: 0 - NO PAIN
PAIN_LEVEL: 0

## 2024-08-30 ASSESSMENT — COLUMBIA-SUICIDE SEVERITY RATING SCALE - C-SSRS
2. HAVE YOU ACTUALLY HAD ANY THOUGHTS OF KILLING YOURSELF?: NO
1. IN THE PAST MONTH, HAVE YOU WISHED YOU WERE DEAD OR WISHED YOU COULD GO TO SLEEP AND NOT WAKE UP?: NO
6. HAVE YOU EVER DONE ANYTHING, STARTED TO DO ANYTHING, OR PREPARED TO DO ANYTHING TO END YOUR LIFE?: NO

## 2024-08-30 ASSESSMENT — PAIN - FUNCTIONAL ASSESSMENT
PAIN_FUNCTIONAL_ASSESSMENT: 0-10

## 2024-08-30 NOTE — ANESTHESIA PREPROCEDURE EVALUATION
Patient: Hilaire J Kiffer    Procedure Information       Date/Time: 08/30/24 0900    Procedure: Esophagoscopy with Dilatation; Steroid Injection    Location: [facility] A OR 03 / Virtual [facility] OR    Surgeons: Yeimy Hopper MD            Relevant Problems   Anesthesia (within normal limits)      Cardiac   (+) AICD present, double chamber   (+) CHF NYHA class II (symptoms with moderately strenuous activities) (Multi)   (+) Coronary artery disease involving native coronary artery of native heart without angina pectoris   (+) Hyperlipidemia   (+) Hypertension   (+) PVC (premature ventricular contraction)   (+) Paroxysmal ventricular tachycardia (Multi)      Pulmonary  Env allergies  Ex smoker quit 1992      Neuro   (+) Lumbar radiculitis      GI   (+) Dysphagia   (+) Oropharyngeal dysphagia (Since previous anterior cervical disc surgery)      Endocrine   (+) Obesity   (+) Type 2 diabetes mellitus with diabetic polyneuropathy, with long-term current use of insulin (Multi)      Hematology (within normal limits)      Musculoskeletal   (+) Cervical spondylosis with myelopathy   (+) Chronic bilateral low back pain with right-sided sciatica   (+) OA (osteoarthritis) of hip   (+) Primary osteoarthritis of right elbow      HEENT   (+) Mixed hearing loss, unilateral       Clinical information reviewed:    Allergies                NPO Detail:  NPO/Void Status  Date of Last Liquid: 08/29/24  Time of Last Liquid: 2300  Date of Last Solid: 08/29/24  Time of Last Solid: 2000         Physical Exam    Airway  Mallampati: III     Cardiovascular    Dental    Pulmonary    Abdominal          Anesthesia Plan    History of general anesthesia?: yes  History of complications of general anesthesia?: no    ASA 4     general     intravenous induction   Anesthetic plan and risks discussed with patient.

## 2024-08-30 NOTE — ANESTHESIA POSTPROCEDURE EVALUATION
Patient: Hilaire J Kiffer    Procedure Summary       Date: 08/30/24 Room / Location: [facility] A OR 03 / [facility]    Anesthesia Start: 1002 Anesthesia Stop: 1050    Procedure: Esophagoscopy with Dilatation; Steroid Injection Diagnosis:       Dysphagia, unspecified type      (Dysphagia, unspecified type [R13.10])    Surgeons: Yeimy Hopper MD Responsible Provider: Aryan Aguilar MD    Anesthesia Type: general ASA Status: 4            Anesthesia Type: general    Vitals Value Taken Time   /75 08/30/24 1101   Temp 36 °C (96.8 °F) 08/30/24 1045   Pulse 60 08/30/24 1101   Resp 19 08/30/24 1100   SpO2 93 % 08/30/24 1101   Vitals shown include unfiled device data.    Anesthesia Post Evaluation    Patient location during evaluation: bedside  Patient participation: complete - patient cannot participate  Level of consciousness: awake  Pain score: 0  Pain management: adequate  Airway patency: patent  Cardiovascular status: acceptable and hemodynamically stable  Respiratory status: acceptable and nonlabored ventilation  Hydration status: acceptable  Postoperative Nausea and Vomiting: none        No notable events documented.

## 2024-08-30 NOTE — DISCHARGE INSTRUCTIONS
Postoperative Instructions    General: Pain of the nose and throat can be normal after these procedures. A small amount of blood from the nose or mouth can be expected.  Diet: Start with liquids after anesthesia. Soft foods may feel best for the first 2-3 days following your procedure. Soft foods include soup, noodles, scrambled eggs, oatmeal, yogurt, smoothies, applesauce, mashed potatoes, pasta or ice cream. Avoid toast, chips, hard crusted breads, and steak or similar meats. After your throat begins to feel less sore, you can continue your normal diet.   Pain Control: You may experience a mild to moderate sore throat or tongue for several days following the procedure. The throat pain may also seem to cause earaches from referred pain. We encourage use of acetaminophen (Tylenol) and /or ibuprofen (Motrin/Advil) for most pain control. These two medications can be taken together or can be alternated. We will sometimes prescribe you something stronger for pain for “break through.”  Use it only as needed. Do not drive while taking any narcotic pain medications.  Follow-up: Your follow-up with your doctor should be scheduled 2-3 weeks following surgery. If a biopsy was preformed, results will usually be available in the system 7 days following the surgery. You will be informed of the results.   Please call the office or go to the emergency room if you experience any of the following: difficulty breathing, inability to swallow, severe chest pain, fever great than 101 degrees, significant bleeding, or any new/concerning symptoms.  Contact:  During office hours between 8 AM and 5 PM, please call Dr. Hopper's office at [phone].  If you have an emergency over night or on the weekend, please call [phone] and ask the  to connect you to the ENT resident on call.

## 2024-08-30 NOTE — H&P
History Of Present Illness  Hilaire J Kiffer is a 68 y.o. male presenting with dysphagia with history of ACDF.     Past Medical History  Past Medical History:   Diagnosis Date    Diabetes (Multi)     H/O abdominal ultrasound 11/19/2020    No abdominal aortic aneurysm identified    H/O CT scan 04/01/2024    Neck - 1. Diffuse thickening of the oropharyngeal mucosa and palatine tonsils and mild heterogeneity of the right palatine tonsil without peritonsillar tonsillar abscess.This can be seen with pharyngitis/tonsillitis. However recommend continued clinical attention to resolution, failure to which would require repeating the CT.    H/O magnetic resonance imaging of cervical spine 11/28/2023    Status post anterior fusion and posterior decompression of the cervical spine. The canal is patent. Multifocal areas of signal abnormality within the cord in keeping with area of myelomalacia.    H/O magnetic resonance imaging of cervical spine 09/06/2022    1.  Multilevel spondylosis and postsurgical changes with multilevel moderate spinal canal and ventral cord flattening. No cord signal abnormality.  2.  Varying levels of neural foraminal stenosis, moderate to severe on the left at C5-6 and bilaterally at C7-T1.    H/O pelvic x-ray 03/14/2024    Right - Mild right hip osteoarthritis. No acute fracture or dislocation. There are vascular calcifications.    H/O x-ray of lumbar spine 03/14/2024    There is slight levocurvature of the lumbar spine with apex centered at L3. No acute fracture or aggressive osseous lesion. There is no significant listhesis. There are moderate multilevel degenerative changes, greatest at L2-S1. There are degenerative changes of the spinous processes which appear in close proximity.       Surgical History  Past Surgical History:   Procedure Laterality Date    CERVICAL SPINE SURGERY      fusion 2022    PACEMAKER PLACEMENT      has a defibrilator placed 4/2011 2018 added a lead to pacemaker 2 new  leads 8/23        Social History  Social History     Socioeconomic History    Marital status: Single     Spouse name: Not on file    Number of children: Not on file    Years of education: Not on file    Highest education level: Not on file   Occupational History    Not on file   Tobacco Use    Smoking status: Former     Types: Cigarettes    Smokeless tobacco: Never   Vaping Use    Vaping status: Never Used   Substance and Sexual Activity    Alcohol use: Not Currently    Drug use: Never    Sexual activity: Not on file   Other Topics Concern    Not on file   Social History Narrative    Not on file     Social Determinants of Health     Financial Resource Strain: Medium Risk (12/19/2023)    [facility] from [facility]    Overall Financial Resource Strain (CARDIA)     Difficulty of Paying Living Expenses: Somewhat hard   Food Insecurity: No Food Insecurity (12/19/2023)    [facility] from [facility]    Hunger Vital Sign     Worried About Running Out of Food in the Last Year: Never true     Ran Out of Food in the Last Year: Never true   Transportation Needs: No Transportation Needs (12/19/2023)    Received from [facility]    PRAPARE - Transportation     Lack of Transportation (Medical): No     Lack of Transportation (Non-Medical): No   Physical Activity: Inactive (12/19/2023)    Received from [facility]    Exercise Vital Sign     Days of Exercise per Week: 1 day     Minutes of Exercise per Session: 0 min   Stress: Stress Concern Present (12/19/2023)    [facility]    [place] [facility] [employer] - Occupational Stress Questionnaire     Feeling of Stress : To some extent   Social Connections: Unknown (12/19/2023)    Received from [facility]    Social Connection and Isolation Panel [NHANES]     Frequency of Communication with Friends and Family:  Three times a week     Frequency of Social Gatherings with Friends and Family: Once a week     Attends [employer] Services: Patient declined     Active Member of Clubs or Organizations: No     Attends Club or Organization Meetings: Never     Marital Status: Never    Intimate Partner Violence: Not At Risk (12/19/2023)    Received from [employer]    Humiliation, Afraid, Rape, and Kick questionnaire     Fear of Current or Ex-Partner: No     Emotionally Abused: No     Physically Abused: No     Sexually Abused: No   Housing Stability: Low Risk  (12/19/2023)    Received from [employer]    Housing Stability Vital Sign     Unable to Pay for Housing in the Last Year: No     Number of Places Lived in the Last Year: 2     Unstable Housing in the Last Year: No       Family History  Family History   Problem Relation Name Age of Onset    Heart attack Mother      Heart attack Father      Diabetes type II Father      Alzheimer's disease Father      Heart attack Brother  52        Allergies  No Known Allergies    Review of Systems  A 12-point review of systems was performed and noted be negative except for that which was mentioned in the history of present illness.     Physical Exam  Gen- NAD  Resp- nonlabored on RA, symmetric chest rise  CV- well perfused  Head/Face- NCAT, no masses or lesions  Eyes- EOMI, clear sclera  Ears- normal external ears, no gross lesions of EACs  Nose- anterior nares clear, no bleeding or drainage  Mouth- lips without lesions, no excessive drooling  Neuro- alert and interactive     Last Recorded Vitals  Vitals:    08/30/24 0809   BP: 122/70   Pulse: 60   Resp: 16   Temp: 36 °C (96.8 °F)   SpO2: 97%         Assessment/Plan   Hilaire J Kiffer is a 68 y.o. male with dysphagia and history of ACDF.    - Proceed with esophagoscopy, dilation, possible steroid injection as scheduled      Elizabeth Norman MD  PGY-4  Otolaryngology - Head and Neck Surgery  Personal:  [phone] (Epic Chat preferred), Adult ENT Consults: [id number], Peds ENT Team: [id number]

## 2024-08-30 NOTE — OP NOTE
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]     * H/O cervical spine surgery [Z98.890]     * Escamilla's esophagus with esophagitis [K22.70, K20.90]     Procedures  Esophagoscopy with Dilatation; Steroid Injection  95461 - WV ESOPHAGOSCOPY DILATE ESOPHAGUS BALLOON 30 MM    [place] ESOPHAGOSCOPY FLEXIBLE TRANSNASAL DIAGNOSTIC [81121]  WV ESOPHAGOSCOPY FLEXIBLE TRANSORAL W SUBMUCOUS INJ [33776]  Surgeons      * eYimy Hopper - Primary    Resident/Fellow/Other Assistant:  Elizabeth Norman    Procedure Summary  Anesthesia: General  ASA: IV  Anesthesia Staff: Anesthesiologist: Aryan Aguilar MD  CRNA: EMORY Morrison-CRNA  Estimated Blood Loss: 0 mL  Intra-op Medications:   Administrations occurring from 0900 to 1015 on 24:   Medication Name Total Dose   lactated Ringer's infusion Cannot be calculated              Anesthesia Record               Intraprocedure I/O Totals       None           Specimen:   ID Type Source Tests Collected by Time   1 : DISTAL ESOPHAGUS OF SQUAMO- COLUMNAR JUNCTION Tissue ESOPHAGUS DISTAL BIOPSY SURGICAL PATHOLOGY EXAM Yeimy Hopper MD 2024 1019        Staff:   Circulator: Royce Vaughan Person: Marilyn         Drains and/or Catheters: * None in log *    Tourniquet Times:         Implants:     Findings:   Grade C esophagitis and concern for C1M0 Escamilla's esophagus with nodular irregularity, biopsied  3-4cm hiatal hernia, Hill Grade 3 flap valve     Indications: Hilaire J Kiffer is an 68 y.o. male who is having surgery for Dysphagia, unspecified type [R13.10].     The patient was seen in the preoperative area. The risks, benefits,  complications, treatment options, non-operative alternatives, expected recovery and outcomes were discussed with the patient. The possibilities of reaction to medication, pulmonary aspiration, injury to surrounding structures, bleeding, recurrent infection, the need for additional procedures, failure to diagnose a condition, and creating a complication requiring transfusion or operation were discussed with the patient. The patient concurred with the proposed plan, giving informed consent.  The site of surgery was properly noted/marked if necessary per policy. The patient has been actively warmed in preoperative area. Preoperative antibiotics are not indicated. Venous thrombosis prophylaxis have been ordered including bilateral sequential compression devices    Procedure Details:     The patient was brought into the OR and placed in the upright position. The distal chip video esophagoscope was passed through the upper esophageal sphincter into the cervical esophagus. The following findings were noted:    1) Laryngopharynx: vocal folds mobile bilaterally    2) Pharyngoesophageal segment: mildly narrow    3) Cervical esophagus: normal    4) Gastroesophageal junction: Grade C esophagitis. Mucosal changes suggestive of Escamilla's esophagus C1M0. Biopsies taken from squamocolumnar junction and polypoid growth at this site.    5) Gastric cardia and fundus: Hill Grade 3  hiatal hernia.    After completion of the diagnostic esophagoscopy a guidewire was placed through the esophagoscope. The endoscope was then replaced and under direct visualization the area of luminal narrowing was sequentially dilated up to 32 mm.     Next, we injected 0.6 ml of kenalog into the posterior pharyngeal wall.     The patient tolerated the procedure well.  There were no procedural complications.     Complications:  None; patient tolerated the procedure well.    Disposition: PACU - hemodynamically stable.  Condition: stable           Attending  Attestation: I was present and scrubbed for the entirety of the procedure.     Yeimy Hopper MD, MAEd    [facility] [employer]  [facility]  Department of Otolaryngology - Head and Neck Surgery  [facility]

## 2024-09-03 ASSESSMENT — PAIN SCALES - GENERAL: PAINLEVEL_OUTOF10: 0 - NO PAIN

## 2024-09-06 ENCOUNTER — APPOINTMENT (OUTPATIENT)
Dept: PHARMACY | Facility: HOSPITAL | Age: 69
End: 2024-09-06
Payer: COMMERCIAL

## 2024-09-06 DIAGNOSIS — Z79.899 POLYPHARMACY: ICD-10-CM

## 2024-09-06 NOTE — PROGRESS NOTES
Clinical Pharmacy Appointment    Patient ID: Hilaire J Kiffer is a 68 y.o. male who presents for Diabetes.    Pt is here for First appointment.     Referring Provider: Yadi Contreras, *  PCP: Yadi Contreras MD   Last visit with PCP: 7/29/2024   Next visit with PCP: 10/14/2024      Subjective     Interval History  Recently had retired in April, taxes last year higher than expected because he took social security and was employed longer than expected    HPI  DIABETES MELLITUS TYPE 2:    Diagnosed with diabetes. Known diabetic complications: neuropathy, CAD.  Does patient follow with Endocrinology: No     Current diabetic medications include:  Victoza 1.8 mg daily  Jardiance 10 mg daily  Lantus 28 units daily  Metformin 1000 mg twice daily    Patient did not have time to review current information today, feeling overwhelmed with recent medical and billing issues. States has enough medication for the remainder of the year. Wishes to readdress medication cost plan end of December after deciding Medicare updates for the upcoming year.    Secondary Prevention:  Statin? Yes  ACE-I/ARB? Yes  Aspirin? Yes    Drug Interactions  The following drug interactions were noted:    Metformin dose is recommended to be limited to 1700 mg daily on concurrent Ranexa 1000 mg twice daily    Medication System Management  Patient's preferred pharmacy: for future: mail order through   Adherence/Organization: unable to assess  Affordability/Accessibility: has enough for remainder of the year, was expensive for Victoza and Jardiance      Objective   No Known Allergies  Social History     Social History Narrative    Not on file      Medication Review  Current Outpatient Medications   Medication Instructions    aspirin 81 mg, oral, Daily RT    atorvastatin (LIPITOR) 40 mg, oral, Daily RT    cyanocobalamin (VITAMIN B-12) 500 mcg, oral, Daily    furosemide (LASIX) 40 mg, oral, Daily RT    Jardiance 10 mg, oral, Daily RT    Lantus  "Solostar U-100 Insulin 28 Units, subcutaneous    liraglutide (VICTOZA) 1.8 mg, subcutaneous, Daily    lisinopril 2.5 mg, oral, Daily RT    loratadine (CLARITIN) 10 mg, oral, Daily RT    metFORMIN (Glucophage) 1,000 mg tablet 1 tablet, oral, 2 times daily (morning and late afternoon)    metoprolol succinate XL (TOPROL-XL) 200 mg, oral, Daily RT    omeprazole (PRILOSEC) 40 mg, oral, 2 times daily before meals, Do not crush or chew.    pen needle, diabetic 32 gauge x 5/32\" needle Use daily as directed    ranolazine (RANEXA) 1,000 mg, oral, 2 times daily, Do not crush, chew, or split.    spironolactone (ALDACTONE) 12.5 mg, oral, Daily RT      Vitals  BP Readings from Last 2 Encounters:   08/30/24 104/54   07/29/24 122/78     BMI Readings from Last 1 Encounters:   08/30/24 33.02 kg/m²      Labs  A1C  Lab Results   Component Value Date    HGBA1C 6.3 (H) 05/24/2024    HGBA1C 6.8 (H) 01/25/2024    HGBA1C 6.2 (H) 08/17/2023     BMP  Lab Results   Component Value Date    CALCIUM 9.4 05/24/2024     05/24/2024    K 5.1 05/24/2024    CO2 31 05/24/2024     05/24/2024    BUN 20 05/24/2024    CREATININE 0.98 05/24/2024    EGFR 84 05/24/2024     LFTs  Lab Results   Component Value Date    ALT 14 05/24/2024    AST 13 05/24/2024    ALKPHOS 55 05/24/2024    BILITOT 0.7 05/24/2024     FLP  Lab Results   Component Value Date    TRIG 104 05/24/2024    CHOL 90 05/24/2024    LDLCALC 29 05/24/2024    HDL 40.0 05/24/2024     Urine Microalbumin  No results found for: \"MICROALBCREA\"  Weight Management  Wt Readings from Last 3 Encounters:   08/30/24 90 kg (198 lb 6.6 oz)   08/28/24 90.7 kg (200 lb)   07/29/24 90.8 kg (200 lb 3.2 oz)      There is no height or weight on file to calculate BMI.     Assessment/Plan   Problem List Items Addressed This Visit       Polypharmacy     Unable to fully review today.    Will plan on revisiting medication assistance in December after open enrollment for Medicare and more medical billing issues " have been resolved.    Has enough medication for the next few months.         Relevant Orders    Follow Up In Clinical Pharmacy       Clinical Pharmacist follow-up: December, Telehealth visit    Continue all meds under the continuation of care with the referring provider and clinical pharmacy team.    Thank you,  Morena Sparks, PharmD  Clinical Pharmacist  [phone]    Verbal consent to manage patient's drug therapy was obtained from the patient. They were informed they may decline to participate or withdraw from participation in pharmacy services at any time.

## 2024-09-06 NOTE — ASSESSMENT & PLAN NOTE
Unable to fully review today.    Will plan on revisiting medication assistance in December after open enrollment for Medicare and more medical billing issues have been resolved.    Has enough medication for the next few months.

## 2024-09-10 ENCOUNTER — TELEMEDICINE (OUTPATIENT)
Dept: OTOLARYNGOLOGY | Facility: CLINIC | Age: 69
End: 2024-09-10
Payer: COMMERCIAL

## 2024-09-10 DIAGNOSIS — Z98.890 HISTORY OF CERVICAL SPINAL SURGERY: ICD-10-CM

## 2024-09-10 DIAGNOSIS — R13.10 DYSPHAGIA, UNSPECIFIED TYPE: Primary | ICD-10-CM

## 2024-09-10 DIAGNOSIS — R49.0 HOARSENESS OF VOICE: ICD-10-CM

## 2024-09-10 LAB
LABORATORY COMMENT REPORT: NORMAL
PATH REPORT.FINAL DX SPEC: NORMAL
PATH REPORT.GROSS SPEC: NORMAL
PATH REPORT.RELEVANT HX SPEC: NORMAL
PATH REPORT.TOTAL CANCER: NORMAL

## 2024-09-10 PROCEDURE — 4010F ACE/ARB THERAPY RXD/TAKEN: CPT | Performed by: OTOLARYNGOLOGY

## 2024-09-10 PROCEDURE — 1123F ACP DISCUSS/DSCN MKR DOCD: CPT | Performed by: OTOLARYNGOLOGY

## 2024-09-10 PROCEDURE — 3048F LDL-C <100 MG/DL: CPT | Performed by: OTOLARYNGOLOGY

## 2024-09-10 PROCEDURE — 1160F RVW MEDS BY RX/DR IN RCRD: CPT | Performed by: OTOLARYNGOLOGY

## 2024-09-10 PROCEDURE — 99214 OFFICE O/P EST MOD 30 MIN: CPT | Performed by: OTOLARYNGOLOGY

## 2024-09-10 PROCEDURE — 1036F TOBACCO NON-USER: CPT | Performed by: OTOLARYNGOLOGY

## 2024-09-10 PROCEDURE — 3044F HG A1C LEVEL LT 7.0%: CPT | Performed by: OTOLARYNGOLOGY

## 2024-09-10 PROCEDURE — 1159F MED LIST DOCD IN RCRD: CPT | Performed by: OTOLARYNGOLOGY

## 2024-09-10 NOTE — PROGRESS NOTES
Patient: Hilaire J Kiffer   MRN: [id number] YOB: 1955   Sex: male Age: 68 y.o.  Date of Service: 9/10/2024       ASSESSMENT AND PLAN  I discussed the findings with Hilaire J Kiffer and have recommended the followin. Dysphagia acute on chronic, history of ACDF, acute dysphagia resolving. We discussed esophagoscopy and dilation for his baseline solid food dysphagia and he is s/p esophagoscopy with dilation to 32mm, 3-4cm HH and possible C1M0 Escamilla's esophagus 24.  He would need to schedule at [facility] or [facility] given he has a pacemaker/defibrillator  - Follow-up pathology, will discuss whether or not he needs to start PPI pending biopsy results  - Consider GI referral, he is seeing someone in a few weeks for a colonoscopy  - Schedule for repeat esophagoscopy and dilation, possible steroid injections as needed    2. Dysphonia, worsened after esophagoscopy  - Continue to monitor, anticipate will resolve      CHIEF COMPLAINT  Follow-up dysphagia      HISTORY OF PRESENT ILLNESS  Hilaire J Kiffer is a 68 y.o. male who we have been following for dysphagia to solids and liquids s/p ACDF 2022    9/10/24  S/p esophagoscopy with dilation to 32mm, 3-4cm HH and possible C1M0 Escamilla's esophagus 24. He has not started his PPI. He notes some dysphonia after the procedure. Overall swallowing is ok    24  He reports he is still having some issues swallowing. He is interested in proceeding with a dilation.    24  Cervical fusion surgery approx. 2 years ago. Since then his swallow has been difficult and worsened when pt became ill 6 weeks ago with a sore throat and an ear ache such that he was unable to swallow anything. He went 3 days without eating. He went to urgent care and got amoxicillin. Then went to the ED at [place] where he had a CT scan. Now he reports is swallowing is slowly improving, about where he was before this most recent episode.    The dysphagia history includes:      Dysphagia  for solids               yes    Dysphagia for liquids              no    Dysphagia for pills                  no  Associated weight loss            no    Recent pneumonia/bronchitis  no  GERD/Acid reflux   no    PMH: DM (A1C 6.1), pacemaker/defibrillator  Meds: No blood thinners  SH: former smoker    ADDITIONAL HISTORY  Past Medical History  He has a past medical history of Diabetes (Multi), H/O abdominal ultrasound (11/19/2020), H/O CT scan (04/01/2024), H/O magnetic resonance imaging of cervical spine (11/28/2023), H/O magnetic resonance imaging of cervical spine (09/06/2022), H/O pelvic x-ray (03/14/2024), and H/O x-ray of lumbar spine (03/14/2024). Surgical History  He has a past surgical history that includes pacemaker placement and Cervical spine surgery.   Social History  He reports that he has quit smoking. His smoking use included cigarettes. He has never used smokeless tobacco. He reports that he does not currently use alcohol. He reports that he does not use drugs. Allergies  Patient has no known allergies.     Family History  Family History   Problem Relation Name Age of Onset    Heart attack Mother      Heart attack Father      Diabetes type II Father      Alzheimer's disease Father      Heart attack Brother  52        REVIEW OF SYSTEMS  All 10 systems were reviewed and negative except for above.      PHYSICAL EXAM  ENT Physical Exam   GENERAL: Well-nourished and developed, alert and appropriate, no distress, voice [id number]  RESPIRATORY: Breathing quietly, no stridor  HEAD: Normocephalic atraumatic  FACE: Symmetric, no masses or lesions  EYES:  Pupils reactive, sclera clear, external ocular muscles intact, no nystagmus.    EARS:  Pinnae normal.  NOSE:  No anterior lesions, masses or polyps.  ORAL CAVITY/OROPHARYNX:  Buccal mucosa is moist without lesions or masses, tongue midline and palate elevates symmetrically. Tongue mobility intact.  NECK:  Soft. There is no lymphadenopathy or thyromegaly.     NEUROLOGIC:  Cranial nerves II-XII grossly intact.       Last Recorded Vitals  There were no vitals taken for this visit.    RESULTS    Patient Reported Outcome Measures  N/A    Laboratory, Radiology, and Pathology  I personally reviewed the following results, with the following interpretation:   MBS 4/22/24 - mild CP webbing, mild posterior pharyngeal wall thickening over the plate        PROCEDURES  Procedures   None    Flexible Fiberoptic Laryngoscopy 5/14/24    Patient failed a mirror exam due to limitations of equipment and the need for laryngoscopy to assess laryngeal anatomy and function    PREOPERATIVE DIAGNOSIS: dysphagia    POSTOPERATIVE DIAGNOSIS: Same    PROCEDURE: Transnasal videolaryngoscopy    ANESTHESIA:  Topical    COMPLICATIONS:  None    SPECIMENS:  None    PROCEDURE IN DETAIL:  The patient was brought into the endoscopy suite, placed in the upright position.  The nasal cavity was topically decongested anesthetized.  The distal chip video laryngoscope was passed through the nasal cavity.  The nasal cavity and nasopharynx were within normal limits except noted below. The following findings on laryngoscopy were noted:         Tongue Base: no masses or lesions          Left vocal fold mobility: mobile         Right vocal fold mobility: mobile         Glottal closure: complete         Laryngeal muscle tension: L>R FF tension         Symmetry: mildly asymmetric         Vocal fold free edge: no masses or lesions          Other: n/a    The patient tolerated the procedure well.        ----------------------------------------------------------------------  Yeimy Hopper MD, MAEd    [facility]  Department of Otolaryngology - Head and Neck Surgery  [facility]    The total time I spent in care of this patient today (excluding time spent on other billable services) is as follows:    Time Spent  Prep time on day of patient  encounter: 5 minutes  Time spent directly with patient, family or caregiver: 15 minutes  Additional Time Spent on Patient Care Activities: 5 minutes  Documentation Time: 5 minutes  Other Time Spent: 0 minutes  Total: 30 minutes        Virtual or Telephone Consent    An interactive audio and video telecommunication system which permits real time communications between the patient (at the originating site) and provider (at the distant site) was utilized to provide this telehealth service.   Verbal consent was requested and obtained from Hilaire J Kiffer on this date, 09/10/24 for a telehealth visit.

## 2024-09-17 ENCOUNTER — APPOINTMENT (OUTPATIENT)
Dept: OTOLARYNGOLOGY | Facility: CLINIC | Age: 69
End: 2024-09-17
Payer: COMMERCIAL

## 2024-09-24 ENCOUNTER — TELEMEDICINE (OUTPATIENT)
Dept: PHARMACY | Facility: HOSPITAL | Age: 69
End: 2024-09-24
Payer: COMMERCIAL

## 2024-09-24 DIAGNOSIS — E11.42 TYPE 2 DIABETES MELLITUS WITH DIABETIC POLYNEUROPATHY, WITH LONG-TERM CURRENT USE OF INSULIN: ICD-10-CM

## 2024-09-24 DIAGNOSIS — Z79.4 TYPE 2 DIABETES MELLITUS WITH DIABETIC POLYNEUROPATHY, WITH LONG-TERM CURRENT USE OF INSULIN: ICD-10-CM

## 2024-09-24 DIAGNOSIS — Z79.899 POLYPHARMACY: Primary | ICD-10-CM

## 2024-09-24 DIAGNOSIS — Z79.899 POLYPHARMACY: ICD-10-CM

## 2024-09-24 RX ORDER — INSULIN GLARGINE 100 [IU]/ML
28 INJECTION, SOLUTION SUBCUTANEOUS NIGHTLY
Qty: 15 ML | Refills: 2 | Status: SHIPPED | OUTPATIENT
Start: 2024-09-24

## 2024-09-24 RX ORDER — LIRAGLUTIDE 6 MG/ML
1.8 INJECTION SUBCUTANEOUS DAILY
Qty: 9 ML | Refills: 3 | Status: SHIPPED | OUTPATIENT
Start: 2024-09-24

## 2024-09-24 NOTE — ASSESSMENT & PLAN NOTE
Plan to send Victoza and Park scripts to [facility] PAP program. Patient believes he should qualify as currently only through social security.    To review plan for medications moving forward when patient determines new health plan, but appreciates assistance with current medications until this is determined.

## 2024-09-24 NOTE — PROGRESS NOTES
Clinical Pharmacy Appointment    Patient ID: Hilaire J Kiffer is a 68 y.o. male who presents for Med Management and Diabetes.    Pt is here for Follow Up appointment.     Referring Provider: Yadi Contreras, *  PCP: Yadi Contreras MD   Last visit with PCP: 7/29/2024   Next visit with PCP: 10/14/2024      Subjective     Interval History  Recent cost increases for Victoza and Lantus, knows he needs these to keep sugar under good control    HPI  DIABETES MELLITUS TYPE 2:    Diagnosed with diabetes. Known diabetic complications: CAD, neuropathy.  Does patient follow with Endocrinology: No- establishing care with      Current diabetic medications include:  Victoza 1.8 mg once daily  Lantus 28 units daily  Metformin 1000 mg twice daily  Jardiance 10 mg daily    Clarifications to above regimen: cost concerns with Victoza  Adverse Effects: no concerns, tolerated well    Glucose Readings:  Unable to review in depth and recently not as adherent as patient knows he should be to adequately control sugar    Any episodes of hypoglycemia? No, no recent low glucose .  Did patient treat episode of hypoglycemia appropriately? N/A    Lifestyle:  Diet: unable to review in depth  Physical Activity: is active, riding motorcycle; recently had loose screw after surgery to where he is currently very careful    Secondary Prevention:  Statin? Yes  ACE-I/ARB? Yes  Aspirin? Yes    Pertinent PMH Review:  PMH of Pancreatitis: No  PMH of Retinopathy: No  PMH of Urinary Tract Infections: No  PMH of MTC: No     Medication Reconciliation:  No reported changes    Drug Interactions  The following drug interactions were noted:    Metformin dose is recommended to be limited to 1700 mg daily on concurrent Ranexa 1000 mg twice daily    Medication System Management  Patient's preferred pharmacy:  [facility]  Adherence/Organization: admits currently poor adherence due to cost concerns  Affordability/Accessibility: Victoza especially is  "expensive     Patient Assistance Screening (VAF)  Patient verbally reports monthly or yearly income which is less than 400% federal poverty level  Application for program has been submitted for the following medications:   Victoza  Lantus  Patient aware this process may take up to 2 weeks once income documents have been sent to the team.  If approved, medication must be filled through [facility] pharmacy and may be picked up or mailed to patient.   If approved, medication will be billed through insurance, and patient assistance team will pay the copay. This will result in a $0 copay for the patient.  Counseled patient on mechanism of action, side effects, contraindications, and what to do if the patient misses a dose. All patients questions were answered.        Objective   No Known Allergies  Social History     Social History Narrative    Not on file      Medication Review  Current Outpatient Medications   Medication Instructions    aspirin 81 mg, oral, Daily RT    atorvastatin (LIPITOR) 40 mg, oral, Daily RT    cyanocobalamin (VITAMIN B-12) 500 mcg, oral, Daily    furosemide (LASIX) 40 mg, oral, Daily RT    Jardiance 10 mg, oral, Daily RT    Lantus Solostar U-100 Insulin 28 Units, subcutaneous    liraglutide (VICTOZA) 1.8 mg, subcutaneous, Daily    lisinopril 2.5 mg, oral, Daily RT    loratadine (CLARITIN) 10 mg, oral, Daily RT    metFORMIN (Glucophage) 1,000 mg tablet 1 tablet, oral, 2 times daily (morning and late afternoon)    metoprolol succinate XL (TOPROL-XL) 200 mg, oral, Daily RT    omeprazole (PRILOSEC) 40 mg, oral, 2 times daily before meals, Do not crush or chew.    pen needle, diabetic 32 gauge x 5/32\" needle Use daily as directed    ranolazine (RANEXA) 1,000 mg, oral, 2 times daily, Do not crush, chew, or split.    spironolactone (ALDACTONE) 12.5 mg, oral, Daily RT      Vitals  BP Readings from Last 2 Encounters:   08/30/24 104/54   07/29/24 122/78     BMI Readings from Last 1 Encounters:   08/30/24 " "33.02 kg/m²      Labs  A1C  Lab Results   Component Value Date    HGBA1C 6.3 (H) 05/24/2024    HGBA1C 6.8 (H) 01/25/2024    HGBA1C 6.2 (H) 08/17/2023     BMP  Lab Results   Component Value Date    CALCIUM 9.4 05/24/2024     05/24/2024    K 5.1 05/24/2024    CO2 31 05/24/2024     05/24/2024    BUN 20 05/24/2024    CREATININE 0.98 05/24/2024    EGFR 84 05/24/2024     LFTs  Lab Results   Component Value Date    ALT 14 05/24/2024    AST 13 05/24/2024    ALKPHOS 55 05/24/2024    BILITOT 0.7 05/24/2024     FLP  Lab Results   Component Value Date    TRIG 104 05/24/2024    CHOL 90 05/24/2024    LDLCALC 29 05/24/2024    HDL 40.0 05/24/2024     Urine Microalbumin  No results found for: \"MICROALBCREA\"  Weight Management  Wt Readings from Last 3 Encounters:   08/30/24 90 kg (198 lb 6.6 oz)   08/28/24 90.7 kg (200 lb)   07/29/24 90.8 kg (200 lb 3.2 oz)      There is no height or weight on file to calculate BMI.     Assessment/Plan   Problem List Items Addressed This Visit       Type 2 diabetes mellitus with diabetic polyneuropathy, with long-term current use of insulin (Multi)     Plan to send Victoza and Lantus scripts [facility]. Patient believes he should qualify as currently only through social security.    To review plan for medications moving forward when patient determines new health plan, but appreciates assistance with current medications until this is determined.         Polypharmacy       Clinical Pharmacist follow-up: 12/20/2024, Telehealth visit    Continue all meds under the continuation of care with the referring provider and clinical pharmacy team.    Thank you,  Morena Sparks, PharmD  Clinical Pharmacist  [phone]    Verbal consent to manage patient's drug therapy was obtained from the patient. They were informed they may decline to participate or withdraw from participation in pharmacy services at any time.  "

## 2024-09-24 NOTE — Clinical Note
Should be able to get his social security documents to me in the next couple of days and then I can get him set up for free Victoza and Lantus right away. Was not complaining of Jardiance cost at all but can always add that and Ranexa later if needed.

## 2024-09-25 PROCEDURE — RXMED WILLOW AMBULATORY MEDICATION CHARGE

## 2024-09-26 ENCOUNTER — PHARMACY VISIT (OUTPATIENT)
Dept: PHARMACY | Facility: CLINIC | Age: 69
End: 2024-09-26
Payer: MEDICARE

## 2024-10-03 ENCOUNTER — APPOINTMENT (OUTPATIENT)
Dept: GASTROENTEROLOGY | Facility: CLINIC | Age: 69
End: 2024-10-03
Payer: COMMERCIAL

## 2024-10-03 ENCOUNTER — TELEPHONE (OUTPATIENT)
Dept: GASTROENTEROLOGY | Facility: CLINIC | Age: 69
End: 2024-10-03

## 2024-10-03 VITALS
SYSTOLIC BLOOD PRESSURE: 114 MMHG | WEIGHT: 190 LBS | HEIGHT: 65 IN | DIASTOLIC BLOOD PRESSURE: 70 MMHG | BODY MASS INDEX: 31.65 KG/M2 | HEART RATE: 93 BPM

## 2024-10-03 DIAGNOSIS — R13.10 DYSPHAGIA, UNSPECIFIED TYPE: ICD-10-CM

## 2024-10-03 DIAGNOSIS — K22.70 BARRETT'S ESOPHAGUS WITHOUT DYSPLASIA: Primary | ICD-10-CM

## 2024-10-03 DIAGNOSIS — D12.6 ADENOMATOUS POLYP OF COLON, UNSPECIFIED PART OF COLON: ICD-10-CM

## 2024-10-03 DIAGNOSIS — Z12.11 COLON CANCER SCREENING: ICD-10-CM

## 2024-10-03 PROCEDURE — 3078F DIAST BP <80 MM HG: CPT | Performed by: PHYSICIAN ASSISTANT

## 2024-10-03 PROCEDURE — 1123F ACP DISCUSS/DSCN MKR DOCD: CPT | Performed by: PHYSICIAN ASSISTANT

## 2024-10-03 PROCEDURE — 99204 OFFICE O/P NEW MOD 45 MIN: CPT | Performed by: PHYSICIAN ASSISTANT

## 2024-10-03 PROCEDURE — 3074F SYST BP LT 130 MM HG: CPT | Performed by: PHYSICIAN ASSISTANT

## 2024-10-03 PROCEDURE — RXMED WILLOW AMBULATORY MEDICATION CHARGE

## 2024-10-03 PROCEDURE — 4010F ACE/ARB THERAPY RXD/TAKEN: CPT | Performed by: PHYSICIAN ASSISTANT

## 2024-10-03 PROCEDURE — 3008F BODY MASS INDEX DOCD: CPT | Performed by: PHYSICIAN ASSISTANT

## 2024-10-03 PROCEDURE — 3048F LDL-C <100 MG/DL: CPT | Performed by: PHYSICIAN ASSISTANT

## 2024-10-03 PROCEDURE — 3044F HG A1C LEVEL LT 7.0%: CPT | Performed by: PHYSICIAN ASSISTANT

## 2024-10-03 PROCEDURE — 1036F TOBACCO NON-USER: CPT | Performed by: PHYSICIAN ASSISTANT

## 2024-10-03 RX ORDER — POLYETHYLENE GLYCOL 3350, SODIUM SULFATE ANHYDROUS, SODIUM BICARBONATE, SODIUM CHLORIDE, POTASSIUM CHLORIDE 236; 22.74; 6.74; 5.86; 2.97 G/4L; G/4L; G/4L; G/4L; G/4L
4 POWDER, FOR SOLUTION ORAL ONCE
Qty: 4000 ML | Refills: 0 | Status: SHIPPED | OUTPATIENT
Start: 2024-10-03 | End: 2024-10-06

## 2024-10-03 RX ORDER — OMEPRAZOLE 40 MG/1
40 CAPSULE, DELAYED RELEASE ORAL
Qty: 60 CAPSULE | Refills: 3 | Status: SHIPPED | OUTPATIENT
Start: 2024-10-03 | End: 2025-01-31

## 2024-10-03 NOTE — TELEPHONE ENCOUNTER
----- Message from Nichelle Mays sent at 10/3/2024 12:20 PM EDT -----  I realized after the office that patient is on Victoza. He will need to hold this 1 dose before the procedure.

## 2024-10-03 NOTE — PATIENT INSTRUCTIONS
Thank you for coming in regarding a colonoscopy. Because of your age being greater than 45, we recommend a screening colonoscopy to check for colon cancer or polyps. Please let us know if you have any questions about the preparation or procedure. Your laxative has been sent to your pharmacy. Call [phone] with questions or concerns.    You will need to hold your Jardiance 3 days before the colonoscopy.    EGD with Dr. Ferreira showed inflammation of your esophagus. I recommended Omeprazole twice daily for 2 months, then daily for life as we found Barretts esophagus in your biopsies. You will need another EGD in 2-3 months.

## 2024-10-03 NOTE — ASSESSMENT & PLAN NOTE
Discussed with patient that EGD showed Barretts esophagus without dysplasia as well as grade C esophagitis. I recommended BID PPI for 2 months, then daily indefinately. We discussed risk of precancerous or cancerous changes. Repeat EGD recommended in 2-3 years.

## 2024-10-03 NOTE — ASSESSMENT & PLAN NOTE
Patient with 4 colon polyps removed in 2018. No lower GI symptoms. Colonoscopy with golytely ordered for further evaluation

## 2024-10-03 NOTE — PROGRESS NOTES
"Subjective   Patient ID: Hilaire J Kiffer is a 68 y.o. male who was referred by her PCP for No chief complaint on file..    Patient's PCP is Dr. Contreras    PMH: DM, HLD, HTN, pacemaker/debrillator present    HPI  Patient has been evaluated by ENT for esophageal dysphagia. Her last EGD was August 2024 and a pharyngoesophageal segment with mildly narrow. Dilation was done to 32mm. Repeat dilation recommended at [facility] due to pacemaker present. Patient was also found to have grade C esophagitis and biopsies came back with Barretts esophagus without dysplasia. Patient was prescribed PPI, but patient states that he hasn't been taking PPI as he hasn't had \"heartburn\". He states that his dysphagia is fairly stable. He was going to have further treatment with ENT, but was found to have a broken screw in his neck that they are concidering fixing.     Patient had a colonoscopy in 2018 in which 4 polyps were removed. He denies unexplained weight loss, N/V, abdominal pain, change in bowel habits, melena, hematochezia.    Social History:  NSAIDs: Denies  Tobacco: Denies, former  Alcohol: Quit in 1992  Drug use: Denies    Family History: Denies any known family history of GI malignancy.    Prior GI evaluation:  EGD with ENT 8/30/24: grade C esophagitis seen, pharyngoesophageal segment mildly narrow with dilation to 32mm. A 3-4 cm hiatal hernia was seen. There was possible Barretts esopahgus changes. Biopsies of the esophagus confirmed Barretts esophagus without dysplasia.    Colonoscopy 12/11/2018: 4 polyps were removed    Review of Systems:  Constitutional: No reported fever, chills, or weight loss.  Skin: No reported icterus, lesions, or rash.  Eye: No reported itching, pain, vision changes.  Ear: No reported discharge, hearing loss, or pain.  Nose: No reported congestion, discharge, or epistaxis.  Mouth/throat: +dysphagia  Resp: No reported cough, dyspnea, or wheezing.  Cardiovascular: No reported chest pain, lower extremity " edema, or palpitations.   GI: No reported abdominal pain, diarrhea, constipation, change in bowel habits, nausea, or vomiting.  : No reported dysuria, hematuria, or frequency.  Neuro: No reported confusion, memory loss, headaches, or dizziness.  Psych: No reported anxiety, depression, or insomnia.  Musculoskeletal: No reported arthralgia, joint swelling, or myalgias.  Heme/lymph: No reported easy bleeding or bruising, or swollen lymph nodes.  Endocrine: No reported cold/heat intolerance, polydipsia, or polyuria.     Medications:  Prior to Admission medications    Medication Sig Start Date End Date Taking? Authorizing Provider   aspirin 81 mg EC tablet Take 1 tablet (81 mg) by mouth once daily. 4/24/23   Historical Provider, MD   atorvastatin (Lipitor) 40 mg tablet Take 1 tablet (40 mg) by mouth once daily. 9/20/23 9/19/24  Historical Provider, MD   cyanocobalamin (Vitamin B-12) 500 mcg tablet Take 1 tablet (500 mcg) by mouth once daily.    Historical Provider, MD   furosemide (Lasix) 40 mg tablet Take 1 tablet (40 mg) by mouth once daily. 5/30/23 7/29/24  Historical Provider, MD   insulin glargine (Lantus Solostar U-100 Insulin) 100 unit/mL (3 mL) pen Inject 28 Units under the skin once daily at bedtime. 9/24/24   Yadi Contreras MD   Jardiance 10 mg Take 1 tablet (10 mg) by mouth once daily. 7/29/24   Yadi Contreras MD   liraglutide (Victoza) 0.6 mg/0.1 mL (18 mg/3 mL) injection Inject 0.3 mL (1.8 mg) under the skin once daily. 9/24/24   Yadi Contreras MD   lisinopril 5 mg tablet Take 0.5 tablets (2.5 mg) by mouth once daily. 7/18/12   Historical Provider, MD   loratadine (Claritin) 10 mg tablet Take 1 tablet (10 mg) by mouth once daily. 9/20/23 9/19/24  Historical Provider, MD   metFORMIN (Glucophage) 1,000 mg tablet Take 1 tablet (1,000 mg) by mouth 2 times daily (morning and late afternoon). 8/4/23   Historical Provider, MD   metoprolol succinate XL (Toprol-XL) 200 mg 24 hr tablet Take 1  "tablet (200 mg) by mouth once daily. 1/22/24 1/21/25  Historical Provider, MD   omeprazole (PriLOSEC) 20 mg DR capsule Take 2 capsules (40 mg) by mouth 2 times a day before meals. Do not crush or chew. 8/30/24 9/29/24  Elizabeth Norman MD   pen needle, diabetic 32 gauge x 5/32\" needle Use daily as directed 7/29/24   Yadi Contreras MD   ranolazine (Ranexa) 1,000 mg 12 hr tablet Take 1 tablet (1,000 mg) by mouth 2 times a day. Do not crush, chew, or split.    Historical Provider, MD   spironolactone (Aldactone) 25 mg tablet Take 0.5 tablets (12.5 mg) by mouth once daily. 11/3/23   Historical Provider, MD       Allergies:  Patient has no known allergies.    Objective   Physical exam:  Constitutional: Well developed, well nourished 68 y.o. year old in no acute distress.   Skin: Warm and dry. Normal turgor. No rash, ulcer, trauma, jaundice.   Eyes: Pupils symmetric and reactive to light.  Respiratory: Clear to auscultation bilaterally. No wheezes, rhonchi, or rales heard.  Cardiovascular: Regular rate and rhythm. S1 and S2 appreciated and normal. No murmur, rub, or gallop heard.   Edema: No edema noted.  GI: Normal bowel sounds. Soft, non-distended, non-tender. No rebound or guarding present. No hepatomegaly or splenomegaly appreciated. Abdominal aorta not palpably abnormal.  Musculoskeletal: Limbs and Joints grossly normal. Full range of motion in major joint.   Neuro: Alert and oriented x 3. Cranial nerves 2-12 grossly intact.   Psych: Normal mood and affect.        Relevant Results Recent labs reviewed in the EMR.    Radiology: Reviewed imaging reviewed in the EMR.      Assessment/Plan   Problem List Items Addressed This Visit             ICD-10-CM    Adenomatous polyp of colon D12.6     Patient with 4 colon polyps removed in 2018. No lower GI symptoms. Colonoscopy with golytely ordered for further evaluation         Relevant Medications    polyethylene glycol (Golytely) 236-22.74-6.74 -5.86 gram solution    Other " Relevant Orders    Colonoscopy Screening; High Risk Patient    Dysphagia R13.10    Relevant Medications    omeprazole (PriLOSEC) 40 mg DR capsule    Escamilla's esophagus without dysplasia - Primary K22.70     Discussed with patient that EGD showed Barretts esophagus without dysplasia as well as grade C esophagitis. I recommended BID PPI for 2 months, then daily indefinately. We discussed risk of precancerous or cancerous changes. Repeat EGD recommended in 2-3 years.           Other Visit Diagnoses         Codes    Colon cancer screening     Z12.11            Meds to hold: Liraglutide x 1 day, jardiance x 3 days,   Nichelle Mays PA-C

## 2024-10-05 ENCOUNTER — PHARMACY VISIT (OUTPATIENT)
Dept: PHARMACY | Facility: CLINIC | Age: 69
End: 2024-10-05
Payer: MEDICARE

## 2024-10-14 ENCOUNTER — APPOINTMENT (OUTPATIENT)
Dept: PRIMARY CARE | Facility: CLINIC | Age: 69
End: 2024-10-14
Payer: COMMERCIAL

## 2024-10-14 VITALS
SYSTOLIC BLOOD PRESSURE: 123 MMHG | WEIGHT: 196.5 LBS | HEIGHT: 65 IN | DIASTOLIC BLOOD PRESSURE: 73 MMHG | OXYGEN SATURATION: 96 % | TEMPERATURE: 97.4 F | HEART RATE: 75 BPM | BODY MASS INDEX: 32.74 KG/M2

## 2024-10-14 DIAGNOSIS — E78.2 MIXED HYPERLIPIDEMIA: ICD-10-CM

## 2024-10-14 DIAGNOSIS — E11.42 TYPE 2 DIABETES MELLITUS WITH DIABETIC POLYNEUROPATHY, WITH LONG-TERM CURRENT USE OF INSULIN: ICD-10-CM

## 2024-10-14 DIAGNOSIS — Z00.00 WELL ADULT EXAM: Primary | ICD-10-CM

## 2024-10-14 DIAGNOSIS — I47.29 PAROXYSMAL VENTRICULAR TACHYCARDIA (MULTI): ICD-10-CM

## 2024-10-14 DIAGNOSIS — E53.8 B12 DEFICIENCY: ICD-10-CM

## 2024-10-14 DIAGNOSIS — Z79.4 TYPE 2 DIABETES MELLITUS WITH DIABETIC POLYNEUROPATHY, WITH LONG-TERM CURRENT USE OF INSULIN: ICD-10-CM

## 2024-10-14 DIAGNOSIS — I10 PRIMARY HYPERTENSION: ICD-10-CM

## 2024-10-14 DIAGNOSIS — K22.70 BARRETT'S ESOPHAGUS WITHOUT DYSPLASIA: ICD-10-CM

## 2024-10-14 DIAGNOSIS — I42.8 CARDIOMYOPATHY, NONISCHEMIC (MULTI): ICD-10-CM

## 2024-10-14 DIAGNOSIS — I50.9 CONGESTIVE HEART FAILURE, NYHA CLASS 2, UNSPECIFIED CONGESTIVE HEART FAILURE TYPE: ICD-10-CM

## 2024-10-14 DIAGNOSIS — Z79.899 POLYPHARMACY: ICD-10-CM

## 2024-10-14 DIAGNOSIS — R13.10 DYSPHAGIA, UNSPECIFIED TYPE: ICD-10-CM

## 2024-10-14 PROCEDURE — 3074F SYST BP LT 130 MM HG: CPT | Performed by: INTERNAL MEDICINE

## 2024-10-14 PROCEDURE — 1036F TOBACCO NON-USER: CPT | Performed by: INTERNAL MEDICINE

## 2024-10-14 PROCEDURE — 1159F MED LIST DOCD IN RCRD: CPT | Performed by: INTERNAL MEDICINE

## 2024-10-14 PROCEDURE — 3008F BODY MASS INDEX DOCD: CPT | Performed by: INTERNAL MEDICINE

## 2024-10-14 PROCEDURE — 99214 OFFICE O/P EST MOD 30 MIN: CPT | Performed by: INTERNAL MEDICINE

## 2024-10-14 PROCEDURE — 1160F RVW MEDS BY RX/DR IN RCRD: CPT | Performed by: INTERNAL MEDICINE

## 2024-10-14 PROCEDURE — G0444 DEPRESSION SCREEN ANNUAL: HCPCS | Performed by: INTERNAL MEDICINE

## 2024-10-14 PROCEDURE — 1123F ACP DISCUSS/DSCN MKR DOCD: CPT | Performed by: INTERNAL MEDICINE

## 2024-10-14 PROCEDURE — 4010F ACE/ARB THERAPY RXD/TAKEN: CPT | Performed by: INTERNAL MEDICINE

## 2024-10-14 PROCEDURE — 99397 PER PM REEVAL EST PAT 65+ YR: CPT | Performed by: INTERNAL MEDICINE

## 2024-10-14 PROCEDURE — 3078F DIAST BP <80 MM HG: CPT | Performed by: INTERNAL MEDICINE

## 2024-10-14 PROCEDURE — 3048F LDL-C <100 MG/DL: CPT | Performed by: INTERNAL MEDICINE

## 2024-10-14 PROCEDURE — 3044F HG A1C LEVEL LT 7.0%: CPT | Performed by: INTERNAL MEDICINE

## 2024-10-14 RX ORDER — OMEPRAZOLE 40 MG/1
40 CAPSULE, DELAYED RELEASE ORAL
Start: 2024-10-14 | End: 2025-02-11

## 2024-10-14 ASSESSMENT — PATIENT HEALTH QUESTIONNAIRE - PHQ9
SUM OF ALL RESPONSES TO PHQ9 QUESTIONS 1 AND 2: 0
2. FEELING DOWN, DEPRESSED OR HOPELESS: NOT AT ALL
1. LITTLE INTEREST OR PLEASURE IN DOING THINGS: NOT AT ALL

## 2024-10-14 NOTE — PROGRESS NOTES
Chief Complaint:   Medicare Wellness Exam/Comprehensive Problem Focused Follow Up and Physical Exam    HPI:  Taking less of med   [facility] got rest of meds  Mail order   Nov 1 colonoscopy  C 6 cervical spine screw broken  Will fu w ns in dec  Options given to wait or do surgery  Hands bother him  Holding off on surgery for now  Stretched esophagus  Gets up often to urinate for years per pt  Sees podiatry q3 mo  Hard to grasp things hands since 2022  Fumbles w hands  Did take of apt complex not working now   No cp no sob    Patient Care Team:  Yadi Contreras MD as PCP - General (Pediatrics)  Yadi Contreras MD as PCP - [facility] [employer] PCP   Active Problem List  Patient Active Problem List   Diagnosis    AICD present, double chamber    B12 deficiency    Type 2 diabetes mellitus with diabetic polyneuropathy, with long-term current use of insulin    Hypertension    Adenomatous polyp of colon    Cervical spondylosis with myelopathy    CHF NYHA class II (symptoms with moderately strenuous activities) (Multi)    Chronic right shoulder pain    Chronic bilateral low back pain with right-sided sciatica    Cardiomyopathy, nonischemic (Multi)    Coronary artery disease involving native coronary artery of native heart without angina pectoris    Drug abuse in remission (Multi)    Lumbar radiculitis    Mixed hearing loss, unilateral    OA (osteoarthritis) of hip    PVC (premature ventricular contraction)    S/P ICD (internal cardiac defibrillator) procedure    S/P laminectomy    Paroxysmal ventricular tachycardia (Multi)    Left foot pain    History of colonic polyps    Routine health maintenance    Vitamin D deficiency    Primary osteoarthritis of right elbow    Dysphagia    Obesity    Hyperlipidemia    Polypharmacy    Escamilla's esophagus without dysplasia         Comprehensive Medical/Surgical/Social/Family History  Past Medical History:   Diagnosis Date    CHF (congestive heart failure)      Diabetes (Multi)     H/O abdominal ultrasound 11/19/2020    No abdominal aortic aneurysm identified    H/O CT scan 04/01/2024    Neck - 1. Diffuse thickening of the oropharyngeal mucosa and palatine tonsils and mild heterogeneity of the right palatine tonsil without peritonsillar tonsillar abscess.This can be seen with pharyngitis/tonsillitis. However recommend continued clinical attention to resolution, failure to which would require repeating the CT.    H/O magnetic resonance imaging of cervical spine 11/28/2023    Status post anterior fusion and posterior decompression of the cervical spine. The canal is patent. Multifocal areas of signal abnormality within the cord in keeping with area of myelomalacia.    H/O magnetic resonance imaging of cervical spine 09/06/2022    1.  Multilevel spondylosis and postsurgical changes with multilevel moderate spinal canal and ventral cord flattening. No cord signal abnormality.  2.  Varying levels of neural foraminal stenosis, moderate to severe on the left at C5-6 and bilaterally at C7-T1.    H/O pelvic x-ray 03/14/2024    Right - Mild right hip osteoarthritis. No acute fracture or dislocation. There are vascular calcifications.    H/O x-ray of lumbar spine 03/14/2024    There is slight levocurvature of the lumbar spine with apex centered at L3. No acute fracture or aggressive osseous lesion. There is no significant listhesis. There are moderate multilevel degenerative changes, greatest at L2-S1. There are degenerative changes of the spinous processes which appear in close proximity.    Hypertension      Past Surgical History:   Procedure Laterality Date    CATARACT EXTRACTION      CERVICAL LAMINECTOMY      1991    CERVICAL SPINE SURGERY      fusion 2022 has broken screw 2024    ESOPHAGUS SURGERY  aug.30 2024    PACEMAKER PLACEMENT      has a defibrilator placed 4/2011 2018 added a lead to pacemaker 2 new leads 8/23     Social History     Social History Narrative    Not on  "file        Tobacco/Alcohol/Opioid use, as well as Illicit Drug Use was screened for/reviewed and documented in Social Documentation section of the chart and medication list as appropriate   (~5min spent discussing the above)    Allergies and Medications  Patient has no known allergies.  Current Outpatient Medications   Medication Instructions    aspirin 81 mg, oral, Daily RT    atorvastatin (LIPITOR) 40 mg, oral, Daily RT    cyanocobalamin (VITAMIN B-12) 500 mcg, oral, Daily    furosemide (LASIX) 40 mg, oral, Daily RT    Jardiance 10 mg, oral, Daily RT    Lantus Solostar U-100 Insulin 28 Units, subcutaneous, Nightly    lisinopril 2.5 mg, oral, Daily RT    loratadine (CLARITIN) 10 mg, oral, Daily RT    metFORMIN (Glucophage) 1,000 mg tablet 1 tablet, oral, 2 times daily (morning and late afternoon)    metoprolol succinate XL (TOPROL-XL) 200 mg, oral, Daily RT    omeprazole (PRILOSEC) 40 mg, oral, Daily before breakfast, Do not crush or chew.    pen needle, diabetic 32 gauge x 5/32\" needle Use daily as directed    ranolazine (RANEXA) 1,000 mg, oral, 2 times daily, Do not crush, chew, or split.    spironolactone (ALDACTONE) 12.5 mg, oral, Daily RT    Victoza 3-Alfredo 1.8 mg, subcutaneous, Daily     Medications and Supplements  prescribed by me and other practitioners or clinical pharmacist (such as prescriptions, OTC's, herbal therapies and supplements) were reviewed and documented in the medical record.      Activities of Daily Living  In your present state of health, do you have any difficulty performing the following activities?:   Preparing food and eating?: No  Bathing yourself: No  Getting dressed: No  Using the toilet:No  Moving around from place to place: No  In the past year have you fallen or had a near fall?:No  Able to manage finances independently: Yes  Able to perform grocery shopping: Yes  Able to manage medications independently: Yes  Able to do housework independently: Yes  Patient self-assessment of " "health status? Fair    Depression Screen  (Note: if answer to either of the following is \"Yes\", then a more complete depression screening is indicated)   Q1: Over the past two weeks, have you felt down, depressed or hopeless? No  Q2: Over the past two weeks, have you felt little interest or pleasure in doing things? No    Current exercise habits: none   Dietary issues discussed: Yes  Hearing difficulties: No  Safe in current home environment: Yes  Visual Acuity assessed: No  Cognitive Impairment No    Advance directives  Advanced Care Planning (including a Living Will, Healthcare POA, as well as specific end of life choices and/or directives), was discussed with the patient and/or surrogate, voluntarily, and documented in the Problem List of the medical record.   Rgtere morales   Cardiac Risk Assessment  Cardiovascular risk was discussed and, if needed, lifestyle modifications recommended, including nutritional choices, exercise, and elimination of habits contributing to risk. We agreed on a plan to reduce the current cardiovascular risk based on above discussion as needed.  Aspirin use/disuse was discussed and documented in the Problem List of the medical record after reviewing the updated guidelines below:    Consider low dose Aspirin ( mg) use if the benefit for cardiovascular disease prevention outweighs risk for bleeding complications.   In general, low dose ASA should be considered:  In patients WITHOUT prior MI/stroke/PAD (primary prevention):   a. Age <60: Use if 10-year cardiovascular disease risk >20%, with discussion of risks and benefits with patient  b. Age 60-<70: Use if 10-year cardiovascular disease risk >20% and low bleeding (e.g., gastrointenstinal) risk, with discussion of risks and benefits with patient  c. Age >=70: Do not use    In patients WITH prior MI/stroke/PAD (secondary prevention):   Generally use unless extremely high bleeding (e.g., gastrointenstinal) risk, with discussion of " "risks and benefits with patient        ROS otherwise negative aside from what was mentioned above in HPI.    Gen:  no fever  HEENT:  no trouble swallowing  CV:  no dyspnea, cyanosis  Lungs:  no shortness of breath  GI:  no constipation, no blood in stool  Vascular:  no edema  Neuro:   pos  weakness hands  Skin:  no rash  MS:no joint swelling  Gu:  no urinary complaints  All other systems have been reviewed and are negative for complaint    Vitals  /73   Pulse 75   Temp 36.3 °C (97.4 °F)   Ht 1.651 m (5' 5\")   Wt 89.1 kg (196 lb 8 oz)   SpO2 96%   BMI 32.70 kg/m²   Body mass index is 32.7 kg/m².  Objective   Physical Exam  General Appearance:  Alert and oriented.  NAD  HEENT:  Tm's normal , throat clear, no erythema  Lungs, CTAB  Skin:  no suspicious lesions,  warm and dry  Head :  Normocephalic  Oral Cavity; Clear mucosa moist  Neck/thyroid:  neck supple, full rom, no cervical lymphadenopathy  no thyromegaly  Heart:  RRR  no murmurs  Abdomen:  Normal , bs present, soft, nontender, not distended, no masses palpated  Extremities:  No clubbing, cyanosis, or edema  Neurologic:  Nonfocal dec  bl     Psych: alert, normal mood    During the course of the visit the patient was educated and counseled about age appropriate screening and preventive services. Completed preventive screenings were documented in the chart and orders were placed for outstanding screenings/procedures as documented in the Assessment and Plan.    Patient Instructions (the written plan) was given to the patient at check out.     Problem List Items Addressed This Visit       B12 deficiency    Overview     3/2020 on metformin,   8/2023: B12 level 272         Relevant Orders    Hemoglobin A1C    Comprehensive Metabolic Panel    TSH with reflex to Free T4 if abnormal    Lipid Panel    CBC and Auto Differential    Type 2 diabetes mellitus with diabetic polyneuropathy, with long-term current use of insulin    Overview     Lantus, Metformin, " Mena Grimm  A1C:    09/2021 = 12.8  10/2022 = 6.0  08/2023 = 6.2  Optometry exam: 4/2023.   Podiatry exam: 10/16/2023         Relevant Orders    Hemoglobin A1C    Comprehensive Metabolic Panel    TSH with reflex to Free T4 if abnormal    Lipid Panel    CBC and Auto Differential    Hypertension    Overview     BP Goal <130/80  On ACEi, BB, Diuretic         CHF NYHA class II (symptoms with moderately strenuous activities) (Multi)    Overview     On toprol xl and lisinopril and possible study medication   2010 EF 50%, followed by Dr. Turpin   2018 EF 40%   2020 EF 35%, bmp ok  BP goal <130/80  Managed by [facility]  Continue current medications.   No new symptoms,stable condition.   Follow up at least yearly.           Cardiomyopathy, nonischemic (Multi)    Overview     On toprol xl and lisinopril and possible study medication   2010 EF 50%, followed by Dr. Turpin   2018 EF 40%   2020 EF 35%, bmp ok  Continue current medications.   No new symptoms,stable condition.   Follow up at least yearly.           Relevant Orders    Hemoglobin A1C    Comprehensive Metabolic Panel    TSH with reflex to Free T4 if abnormal    Lipid Panel    CBC and Auto Differential    Paroxysmal ventricular tachycardia (Multi)    Overview     Followed by Cardiology. 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    Continue medications  unless specified otherwise.  Previous labs reviewed.   Other specialty provider notes reviewed.   Follow up in 3 months or prn.      Annual Wellness exam completed   Preventive Health history reviewed:  Vaccines today: none  Labs ordered    Depression Screening done  Advanced Directives Discussion Completed  Cardiovascular risk discussed and if needed, lifestyle modifications recommended, including nutritional choices, exercise, and elimination of habits contributing to risk.  We agreed on a plan to reduce the current cardiovascular risk.  See ecalc ASCVD Risk  Plus for data discussed regarding risk and risk reduction opportunities.  5 minutes spent discussing this.  Aspirin use/disuse was discussed after reviewing the updated guidelines.      5 min spent discussing depression screening  Pt not depressed  Wants to get back to work  Health issues causing some stress

## 2024-10-15 ENCOUNTER — LAB (OUTPATIENT)
Dept: LAB | Facility: LAB | Age: 69
End: 2024-10-15
Payer: COMMERCIAL

## 2024-10-15 DIAGNOSIS — I42.8 CARDIOMYOPATHY, NONISCHEMIC (MULTI): ICD-10-CM

## 2024-10-15 DIAGNOSIS — Z00.00 WELL ADULT EXAM: ICD-10-CM

## 2024-10-15 DIAGNOSIS — E11.42 TYPE 2 DIABETES MELLITUS WITH DIABETIC POLYNEUROPATHY, WITH LONG-TERM CURRENT USE OF INSULIN: ICD-10-CM

## 2024-10-15 DIAGNOSIS — E53.8 B12 DEFICIENCY: ICD-10-CM

## 2024-10-15 DIAGNOSIS — Z79.4 TYPE 2 DIABETES MELLITUS WITH DIABETIC POLYNEUROPATHY, WITH LONG-TERM CURRENT USE OF INSULIN: ICD-10-CM

## 2024-10-15 DIAGNOSIS — Z79.899 POLYPHARMACY: ICD-10-CM

## 2024-10-15 PROBLEM — R13.12 OROPHARYNGEAL DYSPHAGIA: Status: RESOLVED | Noted: 2024-04-23 | Resolved: 2024-10-15

## 2024-10-15 LAB
ALBUMIN SERPL BCP-MCNC: 4 G/DL (ref 3.4–5)
ALP SERPL-CCNC: 59 U/L (ref 33–136)
ALT SERPL W P-5'-P-CCNC: 18 U/L (ref 10–52)
ANION GAP SERPL CALC-SCNC: 13 MMOL/L (ref 10–20)
AST SERPL W P-5'-P-CCNC: 12 U/L (ref 9–39)
BASOPHILS # BLD AUTO: 0.05 X10*3/UL (ref 0–0.1)
BASOPHILS NFR BLD AUTO: 0.5 %
BILIRUB SERPL-MCNC: 0.7 MG/DL (ref 0–1.2)
BUN SERPL-MCNC: 16 MG/DL (ref 6–23)
CALCIUM SERPL-MCNC: 8.9 MG/DL (ref 8.6–10.3)
CHLORIDE SERPL-SCNC: 100 MMOL/L (ref 98–107)
CHOLEST SERPL-MCNC: 103 MG/DL (ref 0–199)
CHOLESTEROL/HDL RATIO: 2.9
CO2 SERPL-SCNC: 28 MMOL/L (ref 21–32)
CREAT SERPL-MCNC: 0.87 MG/DL (ref 0.5–1.3)
EGFRCR SERPLBLD CKD-EPI 2021: >90 ML/MIN/1.73M*2
EOSINOPHIL # BLD AUTO: 0.11 X10*3/UL (ref 0–0.7)
EOSINOPHIL NFR BLD AUTO: 1.2 %
ERYTHROCYTE [DISTWIDTH] IN BLOOD BY AUTOMATED COUNT: 14.6 % (ref 11.5–14.5)
EST. AVERAGE GLUCOSE BLD GHB EST-MCNC: 255 MG/DL
GLUCOSE SERPL-MCNC: 129 MG/DL (ref 74–99)
HBA1C MFR BLD: 10.5 %
HCT VFR BLD AUTO: 46.3 % (ref 41–52)
HDLC SERPL-MCNC: 35.6 MG/DL
HGB BLD-MCNC: 15.5 G/DL (ref 13.5–17.5)
IMM GRANULOCYTES # BLD AUTO: 0.07 X10*3/UL (ref 0–0.7)
IMM GRANULOCYTES NFR BLD AUTO: 0.8 % (ref 0–0.9)
LDLC SERPL CALC-MCNC: 42 MG/DL
LYMPHOCYTES # BLD AUTO: 1.62 X10*3/UL (ref 1.2–4.8)
LYMPHOCYTES NFR BLD AUTO: 17.7 %
MCH RBC QN AUTO: 32.4 PG (ref 26–34)
MCHC RBC AUTO-ENTMCNC: 33.5 G/DL (ref 32–36)
MCV RBC AUTO: 97 FL (ref 80–100)
MONOCYTES # BLD AUTO: 0.73 X10*3/UL (ref 0.1–1)
MONOCYTES NFR BLD AUTO: 8 %
NEUTROPHILS # BLD AUTO: 6.57 X10*3/UL (ref 1.2–7.7)
NEUTROPHILS NFR BLD AUTO: 71.8 %
NON HDL CHOLESTEROL: 67 MG/DL (ref 0–149)
NRBC BLD-RTO: 0 /100 WBCS (ref 0–0)
PLATELET # BLD AUTO: 280 X10*3/UL (ref 150–450)
POTASSIUM SERPL-SCNC: 4.2 MMOL/L (ref 3.5–5.3)
PROT SERPL-MCNC: 6.4 G/DL (ref 6.4–8.2)
RBC # BLD AUTO: 4.78 X10*6/UL (ref 4.5–5.9)
SODIUM SERPL-SCNC: 137 MMOL/L (ref 136–145)
TRIGL SERPL-MCNC: 128 MG/DL (ref 0–149)
TSH SERPL-ACNC: 3.89 MIU/L (ref 0.44–3.98)
VLDL: 26 MG/DL (ref 0–40)
WBC # BLD AUTO: 9.2 X10*3/UL (ref 4.4–11.3)

## 2024-10-15 PROCEDURE — 80053 COMPREHEN METABOLIC PANEL: CPT

## 2024-10-15 PROCEDURE — 84443 ASSAY THYROID STIM HORMONE: CPT

## 2024-10-15 PROCEDURE — 80061 LIPID PANEL: CPT

## 2024-10-15 PROCEDURE — 83036 HEMOGLOBIN GLYCOSYLATED A1C: CPT

## 2024-10-15 PROCEDURE — 36415 COLL VENOUS BLD VENIPUNCTURE: CPT

## 2024-10-15 PROCEDURE — 85025 COMPLETE CBC W/AUTO DIFF WBC: CPT

## 2024-10-17 DIAGNOSIS — E55.9 VITAMIN D DEFICIENCY: ICD-10-CM

## 2024-10-17 DIAGNOSIS — Z79.4 TYPE 2 DIABETES MELLITUS WITH DIABETIC POLYNEUROPATHY, WITH LONG-TERM CURRENT USE OF INSULIN: ICD-10-CM

## 2024-10-17 DIAGNOSIS — E11.42 TYPE 2 DIABETES MELLITUS WITH DIABETIC POLYNEUROPATHY, WITH LONG-TERM CURRENT USE OF INSULIN: ICD-10-CM

## 2024-10-22 ENCOUNTER — TELEPHONE (OUTPATIENT)
Dept: PRIMARY CARE | Facility: CLINIC | Age: 69
End: 2024-10-22
Payer: COMMERCIAL

## 2024-10-22 DIAGNOSIS — K22.70 BARRETT'S ESOPHAGUS WITHOUT DYSPLASIA: ICD-10-CM

## 2024-10-22 NOTE — TELEPHONE ENCOUNTER
Pt called in and left  for Morena Sparks.PharmD. He is requesting a call back from her regarding his Victoza .

## 2024-10-22 NOTE — TELEPHONE ENCOUNTER
Patient stated that [facility], his insurance company, had called and told him his medication copay for Victoza was still around $200 for his next month supply. He is enrolled with  PAP. Reviewed with the patient that the insurance may not see the secondary coverage from  and that he should call [facility] pharmacy to confirm they are still running Victoza through the assistance program. He should be covered through 9/2025 provided that insurance coverage continues.    Morena Sparks, PharmD  [phone]

## 2024-10-23 PROCEDURE — RXMED WILLOW AMBULATORY MEDICATION CHARGE

## 2024-10-24 ENCOUNTER — HOSPITAL ENCOUNTER (OUTPATIENT)
Facility: HOSPITAL | Age: 69
Setting detail: OUTPATIENT SURGERY
End: 2024-10-24
Attending: OTOLARYNGOLOGY | Admitting: OTOLARYNGOLOGY
Payer: COMMERCIAL

## 2024-10-24 DIAGNOSIS — R13.10 DYSPHAGIA, UNSPECIFIED TYPE: ICD-10-CM

## 2024-10-25 ENCOUNTER — HOSPITAL ENCOUNTER (OUTPATIENT)
Dept: CARDIOLOGY | Facility: HOSPITAL | Age: 69
Discharge: HOME | End: 2024-10-25
Payer: COMMERCIAL

## 2024-10-25 ENCOUNTER — PRE-ADMISSION TESTING (OUTPATIENT)
Dept: PREADMISSION TESTING | Facility: HOSPITAL | Age: 69
End: 2024-10-25
Payer: COMMERCIAL

## 2024-10-25 VITALS
DIASTOLIC BLOOD PRESSURE: 58 MMHG | BODY MASS INDEX: 31.65 KG/M2 | WEIGHT: 190 LBS | RESPIRATION RATE: 20 BRPM | HEART RATE: 72 BPM | OXYGEN SATURATION: 97 % | HEIGHT: 65 IN | TEMPERATURE: 96.4 F | SYSTOLIC BLOOD PRESSURE: 96 MMHG

## 2024-10-25 DIAGNOSIS — Z95.810 HISTORY OF CARDIAC DEFIBRILLATOR PLACEMENT: ICD-10-CM

## 2024-10-25 DIAGNOSIS — Z79.4 TYPE 2 DIABETES MELLITUS WITH DIABETIC POLYNEUROPATHY, WITH LONG-TERM CURRENT USE OF INSULIN: ICD-10-CM

## 2024-10-25 DIAGNOSIS — I10 PRIMARY HYPERTENSION: ICD-10-CM

## 2024-10-25 DIAGNOSIS — Z86.0100 HISTORY OF COLONIC POLYPS: ICD-10-CM

## 2024-10-25 DIAGNOSIS — Z01.818 PRE-OP EVALUATION: Primary | ICD-10-CM

## 2024-10-25 DIAGNOSIS — Z01.818 PRE-OP EVALUATION: ICD-10-CM

## 2024-10-25 DIAGNOSIS — E11.42 TYPE 2 DIABETES MELLITUS WITH DIABETIC POLYNEUROPATHY, WITH LONG-TERM CURRENT USE OF INSULIN: ICD-10-CM

## 2024-10-25 LAB
ATRIAL RATE: 69 BPM
P AXIS: 73 DEGREES
PR INTERVAL: 183 MS
Q ONSET: 251 MS
QRS COUNT: 10 BEATS
QRS DURATION: 137 MS
QT INTERVAL: 456 MS
QTC CALCULATION(BAZETT): 489 MS
QTC FREDERICIA: 478 MS
R AXIS: 163 DEGREES
T AXIS: 31 DEGREES
T OFFSET: 479 MS
VENTRICULAR RATE: 69 BPM

## 2024-10-25 PROCEDURE — 93005 ELECTROCARDIOGRAM TRACING: CPT

## 2024-10-25 ASSESSMENT — LIFESTYLE VARIABLES: SMOKING_STATUS: NONSMOKER

## 2024-10-25 NOTE — CPM/PAT H&P
CPM/PAT Evaluation       Name: Hilaire J Kiffer (Hilaire J Kiffer)  /Age: 1955/69 y.o.     In-Person       Chief Complaint: Scheduled for colonoscopy under MAC anesthesia per Dr. Serna on 24.       Past Medical History:   Diagnosis Date    Arthritis     Cervical disc disease     numbness hands    CHF (congestive heart failure)     Coronary artery disease     Diabetes (Multi)     Diabetes mellitus type I (Multi)     Dizziness     H/O abdominal ultrasound 2020    No abdominal aortic aneurysm identified    H/O CT scan 2024    Neck - 1. Diffuse thickening of the oropharyngeal mucosa and palatine tonsils and mild heterogeneity of the right palatine tonsil without peritonsillar tonsillar abscess.This can be seen with pharyngitis/tonsillitis. However recommend continued clinical attention to resolution, failure to which would require repeating the CT.    H/O magnetic resonance imaging of cervical spine 2023    Status post anterior fusion and posterior decompression of the cervical spine. The canal is patent. Multifocal areas of signal abnormality within the cord in keeping with area of myelomalacia.    H/O magnetic resonance imaging of cervical spine 2022    1.  Multilevel spondylosis and postsurgical changes with multilevel moderate spinal canal and ventral cord flattening. No cord signal abnormality.  2.  Varying levels of neural foraminal stenosis, moderate to severe on the left at C5-6 and bilaterally at C7-T1.    H/O pelvic x-ray 2024    Right - Mild right hip osteoarthritis. No acute fracture or dislocation. There are vascular calcifications.    H/O x-ray of lumbar spine 2024    There is slight levocurvature of the lumbar spine with apex centered at L3. No acute fracture or aggressive osseous lesion. There is no significant listhesis. There are moderate multilevel degenerative changes, greatest at L2-S1. There are degenerative changes of the spinous processes which  appear in close proximity.    HL (hearing loss)     right    Hyperlipidemia     Hypertension     Irregular heart beat     Vision loss        Past Surgical History:   Procedure Laterality Date    CATARACT EXTRACTION      CERVICAL LAMINECTOMY      1991    CERVICAL SPINE SURGERY      fusion 2022 has broken screw 2024    ESOPHAGUS SURGERY  aug.30 2024    PACEMAKER PLACEMENT      has a defibrilator placed 4/2011 2018 added a lead to pacemaker 2 new leads 8/23       Patient  reports that he is not currently sexually active and has had partner(s) who are female.    Family History   Problem Relation Name Age of Onset    Heart attack Mother      Heart attack Father      Diabetes type II Father      Alzheimer's disease Father      Heart attack Brother  52       No Known Allergies    Prior to Admission medications    Medication Sig Start Date End Date Taking? Authorizing Provider   aspirin 81 mg EC tablet Take 1 tablet (81 mg) by mouth once daily. 4/24/23   Historical Provider, MD   atorvastatin (Lipitor) 40 mg tablet Take 1 tablet (40 mg) by mouth once daily. 9/20/23 10/14/24  Historical Provider, MD   cyanocobalamin (Vitamin B-12) 500 mcg tablet Take 1 tablet (500 mcg) by mouth once daily.  Patient not taking: Reported on 10/25/2024    Historical Provider, MD   furosemide (Lasix) 40 mg tablet Take 1 tablet (40 mg) by mouth once daily. 5/30/23 10/14/24  Historical Provider, MD   insulin glargine (Lantus Solostar U-100 Insulin) 100 unit/mL (3 mL) pen Inject 28 Units under the skin once daily at bedtime.  Patient taking differently: Inject 28 Units under the skin once daily at bedtime. Takes between midnight and 2 a.m. 9/24/24   Yadi Contreras MD   Jardiance 10 mg Take 1 tablet (10 mg) by mouth once daily. 7/29/24   Yadi Contreras MD   liraglutide (Victoza) 0.6 mg/0.1 mL (18 mg/3 mL) injection Inject 0.3 mL (1.8 mg) under the skin once daily. 9/24/24   Yadi Contreras MD   lisinopril 5 mg tablet Take 0.5  "tablets (2.5 mg) by mouth once daily. 7/18/12   Historical Provider, MD   loratadine (Claritin) 10 mg tablet Take 1 tablet (10 mg) by mouth once daily. 9/20/23 10/14/24  Historical Provider, MD   metFORMIN (Glucophage) 1,000 mg tablet Take 1 tablet (1,000 mg) by mouth 2 times daily (morning and late afternoon). 8/4/23   Historical Provider, MD   metoprolol succinate XL (Toprol-XL) 200 mg 24 hr tablet Take 1 tablet (200 mg) by mouth once daily. 1/22/24 1/21/25  Historical Provider, MD   omeprazole (PriLOSEC) 40 mg DR capsule Take 1 capsule (40 mg) by mouth once daily in the morning. Take before meals. Do not crush or chew.  Patient not taking: Reported on 10/25/2024 10/14/24 2/11/25  Yadi Contreras MD   pen needle, diabetic 32 gauge x 5/32\" needle Use daily as directed 7/29/24   Yadi Contreras MD   ranolazine (Ranexa) 1,000 mg 12 hr tablet Take 1 tablet (1,000 mg) by mouth 2 times a day. Do not crush, chew, or split.    Historical Provider, MD   spironolactone (Aldactone) 25 mg tablet Take 0.5 tablets (12.5 mg) by mouth once daily. 11/3/23   Historical Provider, MD          Visit Vitals  BP 96/58   Pulse 72   Temp 35.8 °C (96.4 °F) (Oral)   Resp 20       DASI Risk Score      Flowsheet Row Questionnaire Series Submission from 8/7/2024 in [facility] with Generic Provider Era   Can you take care of yourself (eat, dress, bathe, or use toilet)?  2.75  filed at 08/07/2024 0956   Can you walk indoors, such as around your house? 1.75  filed at 08/07/2024 0956   Can you walk a block or two on level ground?  2.75  filed at 08/07/2024 0956   Can you climb a flight of stairs or walk up a hill? 5.5  filed at 08/07/2024 0956   Can you run a short distance? 0  filed at 08/07/2024 0956   Can you do light work around the house like dusting or washing dishes? 2.7  filed at 08/07/2024 0956   Can you do moderate work around the house like vacuuming, sweeping floors or carrying groceries? 3.5  filed at 08/07/2024 0956 "   Can you do heavy work around the house like scrubbing floors or lifting and moving heavy furniture?  8  filed at 08/07/2024 0956   Can you do yard work like raking leaves, weeding or pushing a mower? 4.5  filed at 08/07/2024 0956   Can you have sexual relations? 0  filed at 08/07/2024 0956   Can you participate in moderate recreational activities like golf, bowling, dancing, doubles tennis or throwing a baseball or football? 0  filed at 08/07/2024 0956   Can you participate in strenous sports like swimming, singles tennis, football, basketball, or skiing? 0  filed at 08/07/2024 0956   DASI SCORE 31.45  filed at 08/07/2024 0956   METS Score (Will be calculated only when all the questions are answered) 6.6  filed at 08/07/2024 0956          Caprini DVT Assessment      Flowsheet Row Pre-Admission Testing from 10/25/2024 in  [facility]   DVT Score 6 filed at 10/25/2024 0826   Surgical Factors Minor surgery planned filed at 10/25/2024 0826   BMI 31-40 (Obesity) filed at 10/25/2024 0826          Modified Frailty Index    No data to display       CHADS2 Stroke Risk  Current as of 16 minutes ago        5.9% 3 to 100%: High Risk   2 to < 3%: Medium Risk   0 to < 2%: Low Risk     No Change          This score determines the patient's risk of having a stroke if the patient has atrial fibrillation.          Points Metrics   1 Has Congestive Heart Failure:  Yes     Patients with congestive heart failure get 1 point.    Current as of 16 minutes ago   1 Has Hypertension:  Yes     Patients with hypertension get 1 point.    Current as of 16 minutes ago   0 Age:  69     Patients who are 75 years of age or older get 1 point.    Current as of 16 minutes ago   1 Has Diabetes Excluding Gestational Diabetes:  Yes     Patients with diabetes get 1 point.    Current as of 16 minutes ago   0 Had Stroke:  No  Had TIA:  No  Had Thromboembolism:  No     Patients who have had a stroke, TIA, or thromboembolism get 2 points.     Current as of 16 minutes ago             Revised Cardiac Risk Index      Flowsheet Row Pre-Admission Testing from 10/25/2024 in  [facility]   High-Risk Surgery (Intraperitoneal, Intrathoracic,Suprainguinal vascular) 0 filed at 10/25/2024 0827   History of ischemic heart disease (History of MI, History of positive exercuse test, Current chest paint considered due to myocardial ischemia, Use of nitrate therapy, ECG with pathological Q Waves) 1 filed at 10/25/2024 0827   History of congestive heart failure (pulmonary edemia, bilateral rales or S3 gallop, Paroxysmal nocturnal dyspnea, CXR showing pulmonary vascular redistribution) 1 filed at 10/25/2024 0827   History of cerebrovascular disease (Prior TIA or stroke) 0 filed at 10/25/2024 0827   Pre-operative insulin treatment 1 filed at 10/25/2024 0827   Pre-operative creatinine>2 mg/dl 0 filed at 10/25/2024 0827   Revised Cardiac Risk Calculator 3 filed at 10/25/2024 0827          Apfel Simplified Score      Flowsheet Row Pre-Admission Testing from 10/25/2024 in  [facility]   Smoking status 1 filed at 10/25/2024 0828   History of motion sickness or PONV  0 filed at 10/25/2024 0828   Use of postoperative opioids 0 filed at 10/25/2024 0828   Gender - Female 0=No filed at 10/25/2024 0828   Apfel Simplified Score Calculator 1 filed at 10/25/2024 0828          Risk Analysis Index Results This Encounter    No data found in the last 10 encounters.       Stop Bang Score      Flowsheet Row Pre-Admission Testing from 10/25/2024 in  [facility] Questionnaire Series Submission from 8/7/2024 in [facility] with Generic Provider Era   Do you snore loudly? 0 filed at 10/25/2024 0731 1  filed at 08/07/2024 0956   Do you often feel tired or fatigued after your sleep? 0 filed at 10/25/2024 0731 1  filed at 08/07/2024 0956   Has anyone ever observed you stop breathing in your sleep? 0 filed at 10/25/2024 0731 0  filed at 08/07/2024 0956    Do you have or are you being treated for high blood pressure? 1 filed at 10/25/2024 0731 1  filed at 08/07/2024 0956   Recent BMI (Calculated) 32.7 filed at 10/25/2024 0731 32.8  filed at 08/07/2024 0956   Is BMI greater than 35 kg/m2? 0=No filed at 10/25/2024 0731 0=No  filed at 08/07/2024 0956   Age older than 50 years old? 1=Yes filed at 10/25/2024 0731 1=Yes  filed at 08/07/2024 0956   Is your neck circumference greater than 17 inches (Male) or 16 inches (Female)? 0 filed at 10/25/2024 0731 --   Gender - Male 1=Yes filed at 10/25/2024 0731 1=Yes  filed at 08/07/2024 0956   STOP-BANG Total Score 3 filed at 10/25/2024 0731 --          Prodigy: High Risk  Total Score: 23              Prodigy Age Score      Prodigy Gender Score     Prodigy CHF score          ARISCAT Score for Postoperative Pulmonary Complications    No data to display       [place] Perioperative Risk for Myocardial Infarction or Cardiac Arrest (RADHA)    No data to display         Assessment and Plan:     Gastrointestinal:  Scheduled for colonoscopy under MAC anesthesia per Dr. Serna on 11/1/24.   CBC w/ diff, CMP completed on 10/15/24. CBC is WNL, CMP also WNL other than slightly elevated glucose at 129.   A1C was completed 10/15 - it was very elevated @ 10.5%. He's being followed up with by his PCP. He's on x2 oral DM meds, x1 longer term injection and insulin at this time.   EKG shows paced rhythm.   OK to continue ASA.   Last dose jardiance 10/28/24  Last dose victoza on 10/30/24  H&P dated 10/4/24 per PCP Dr. Contreras.   Please take your metoprolol with a small sip of water the morning of surgery.   All surgery instructions reviewed with patient by RN. Verbalized understanding.

## 2024-10-25 NOTE — PREPROCEDURE INSTRUCTIONS
"   Medication List            Accurate as of October 25, 2024  7:32 AM. Always use your most recent med list.                aspirin 81 mg EC tablet     atorvastatin 40 mg tablet  Commonly known as: Lipitor     cyanocobalamin 500 mcg tablet  Commonly known as: Vitamin B-12     furosemide 40 mg tablet  Commonly known as: Lasix     Jardiance 10 mg  Generic drug: empagliflozin  Take 1 tablet (10 mg) by mouth once daily.     Lantus Solostar U-100 Insulin 100 unit/mL (3 mL) pen  Generic drug: insulin glargine  Inject 28 Units under the skin once daily at bedtime.     lisinopril 5 mg tablet     loratadine 10 mg tablet  Commonly known as: Claritin     metFORMIN 1,000 mg tablet  Commonly known as: Glucophage     metoprolol succinate  mg 24 hr tablet  Commonly known as: Toprol-XL     omeprazole 40 mg DR capsule  Commonly known as: PriLOSEC  Take 1 capsule (40 mg) by mouth once daily in the morning. Take before meals. Do not crush or chew.     pen needle, diabetic 32 gauge x 5/32\" needle  Use daily as directed     ranolazine 1,000 mg 12 hr tablet  Commonly known as: Ranexa     spironolactone 25 mg tablet  Commonly known as: Aldactone     Victoza 3-Alfredo 0.6 mg/0.1 mL (18 mg/3 mL) injection  Generic drug: liraglutide  Inject 0.3 mL (1.8 mg) under the skin once daily.                              NPO Instructions:    Clear liquids with bowel prep on 10/31, follow Dr. Serna's instructions  Morning of colonoscopy take your metoprolol with a small sip of water  Stop victoza on 10/30, last dose  Stop jaridaince on 10/28, last dose  No insulin on Friday morning    Additional Instructions:     Wear  comfortable loose fitting clothing  Do not use moisturizers, creams, lotions or perfume  All jewelry and valuables should be left at home  Shower morning of surgery deodorant only  Call with any questions [phone]                                                "

## 2024-10-26 ENCOUNTER — PHARMACY VISIT (OUTPATIENT)
Dept: PHARMACY | Facility: CLINIC | Age: 69
End: 2024-10-26
Payer: MEDICARE

## 2024-10-29 ENCOUNTER — APPOINTMENT (OUTPATIENT)
Dept: GASTROENTEROLOGY | Facility: CLINIC | Age: 69
End: 2024-10-29
Payer: COMMERCIAL

## 2024-10-29 VITALS
WEIGHT: 192 LBS | DIASTOLIC BLOOD PRESSURE: 72 MMHG | BODY MASS INDEX: 31.99 KG/M2 | SYSTOLIC BLOOD PRESSURE: 105 MMHG | HEART RATE: 80 BPM | HEIGHT: 65 IN

## 2024-10-29 DIAGNOSIS — K22.70 BARRETT'S ESOPHAGUS WITHOUT DYSPLASIA: Primary | ICD-10-CM

## 2024-10-29 DIAGNOSIS — D12.6 ADENOMATOUS POLYP OF COLON, UNSPECIFIED PART OF COLON: ICD-10-CM

## 2024-10-29 PROCEDURE — 3046F HEMOGLOBIN A1C LEVEL >9.0%: CPT | Performed by: PHYSICIAN ASSISTANT

## 2024-10-29 PROCEDURE — 1123F ACP DISCUSS/DSCN MKR DOCD: CPT | Performed by: PHYSICIAN ASSISTANT

## 2024-10-29 PROCEDURE — 99214 OFFICE O/P EST MOD 30 MIN: CPT | Performed by: PHYSICIAN ASSISTANT

## 2024-10-29 PROCEDURE — 3048F LDL-C <100 MG/DL: CPT | Performed by: PHYSICIAN ASSISTANT

## 2024-10-29 PROCEDURE — 4010F ACE/ARB THERAPY RXD/TAKEN: CPT | Performed by: PHYSICIAN ASSISTANT

## 2024-10-29 PROCEDURE — 3074F SYST BP LT 130 MM HG: CPT | Performed by: PHYSICIAN ASSISTANT

## 2024-10-29 PROCEDURE — 3078F DIAST BP <80 MM HG: CPT | Performed by: PHYSICIAN ASSISTANT

## 2024-10-29 PROCEDURE — 1036F TOBACCO NON-USER: CPT | Performed by: PHYSICIAN ASSISTANT

## 2024-10-29 PROCEDURE — 3008F BODY MASS INDEX DOCD: CPT | Performed by: PHYSICIAN ASSISTANT

## 2024-10-30 ENCOUNTER — ANESTHESIA EVENT (OUTPATIENT)
Dept: OPERATING ROOM | Facility: HOSPITAL | Age: 69
End: 2024-10-30

## 2024-10-30 RX ORDER — METOCLOPRAMIDE HYDROCHLORIDE 5 MG/ML
10 INJECTION INTRAMUSCULAR; INTRAVENOUS ONCE
OUTPATIENT
Start: 2024-10-30 | End: 2024-10-30

## 2024-10-30 RX ORDER — ONDANSETRON HYDROCHLORIDE 2 MG/ML
4 INJECTION, SOLUTION INTRAVENOUS ONCE AS NEEDED
OUTPATIENT
Start: 2024-10-30

## 2024-10-30 RX ORDER — SODIUM CHLORIDE 9 MG/ML
20 INJECTION, SOLUTION INTRAVENOUS CONTINUOUS
OUTPATIENT
Start: 2024-10-30 | End: 2024-10-31

## 2024-10-30 RX ORDER — SODIUM CITRATE AND CITRIC ACID MONOHYDRATE 334; 500 MG/5ML; MG/5ML
30 SOLUTION ORAL ONCE
OUTPATIENT
Start: 2024-10-30 | End: 2024-10-30

## 2024-10-30 RX ORDER — FAMOTIDINE 10 MG/ML
20 INJECTION INTRAVENOUS ONCE
OUTPATIENT
Start: 2024-10-30 | End: 2024-10-30

## 2024-11-01 ENCOUNTER — ANESTHESIA (OUTPATIENT)
Dept: OPERATING ROOM | Facility: HOSPITAL | Age: 69
End: 2024-11-01

## 2024-11-01 ENCOUNTER — APPOINTMENT (OUTPATIENT)
Dept: OPERATING ROOM | Facility: HOSPITAL | Age: 69
End: 2024-11-01
Payer: COMMERCIAL

## 2024-11-01 DIAGNOSIS — Z95.810 AICD PRESENT, DOUBLE CHAMBER: Primary | ICD-10-CM

## 2024-11-11 PROCEDURE — RXMED WILLOW AMBULATORY MEDICATION CHARGE

## 2024-11-15 ENCOUNTER — PHARMACY VISIT (OUTPATIENT)
Dept: PHARMACY | Facility: CLINIC | Age: 69
End: 2024-11-15
Payer: MEDICARE

## 2024-11-26 ENCOUNTER — TELEPHONE (OUTPATIENT)
Dept: PHARMACY | Facility: HOSPITAL | Age: 69
End: 2024-11-26
Payer: COMMERCIAL

## 2024-11-26 NOTE — TELEPHONE ENCOUNTER
Left message for the patient regarding medication coverage with new insurance plans. Callback number and availability today included in message.    Morena Sparks, PharmD  [phone]

## 2024-11-26 NOTE — TELEPHONE ENCOUNTER
Patient returned call. He is speaking with professional about selecting an insurance plan on Monday morning and plans to have appointment with Juany Martínez that afternoon to go over what may be on formulary. Happy to assist at that time if needed but the patient is not sure yet as of today.    Morena Sparks, PharmD

## 2024-12-02 ENCOUNTER — APPOINTMENT (OUTPATIENT)
Dept: PRIMARY CARE | Facility: CLINIC | Age: 69
End: 2024-12-02
Payer: COMMERCIAL

## 2024-12-03 ENCOUNTER — APPOINTMENT (OUTPATIENT)
Dept: NEUROSURGERY | Facility: CLINIC | Age: 69
End: 2024-12-03
Payer: COMMERCIAL

## 2024-12-03 ENCOUNTER — TELEPHONE (OUTPATIENT)
Dept: PHARMACY | Facility: HOSPITAL | Age: 69
End: 2024-12-03

## 2024-12-03 NOTE — TELEPHONE ENCOUNTER
Patient called to discuss potential options when changing insurance policies.    Many of his concerns deal with maintaining providers.    Plans to bring all care to  moving forward for best communication.    As far as medications, a few that are not on [employer] formulary that are on his current list:  Victoza: not on list, Trulicity and Ozempic are covered  Lantus: Tresiba is covered  Jardiance: is on formulary, expensive but the patient should qualify for assistance as long as insurance pays part of this should be okay  Metformin: no concerns    Is not sure he will stay with [employer] or not at this time.    Will plan to call back when he reviews the available plans to discuss specifics of what medications may need changed. He is open to alternatives.    Reviewed best availability to take call would be Thursday morning or Friday afternoon if he comes to a decision this week.    Morena Sparks, PharmD

## 2024-12-06 DIAGNOSIS — E78.2 MIXED HYPERLIPIDEMIA: Primary | ICD-10-CM

## 2024-12-06 DIAGNOSIS — I47.29 PAROXYSMAL VENTRICULAR TACHYCARDIA (MULTI): ICD-10-CM

## 2024-12-06 PROCEDURE — RXMED WILLOW AMBULATORY MEDICATION CHARGE

## 2024-12-06 RX ORDER — RANOLAZINE 1000 MG/1
1000 TABLET, EXTENDED RELEASE ORAL 2 TIMES DAILY
Qty: 60 TABLET | Refills: 11 | Status: SHIPPED | OUTPATIENT
Start: 2024-12-06 | End: 2025-12-06

## 2024-12-06 RX ORDER — METOPROLOL SUCCINATE 200 MG/1
200 TABLET, EXTENDED RELEASE ORAL
Qty: 30 TABLET | Refills: 11 | Status: SHIPPED | OUTPATIENT
Start: 2024-12-06 | End: 2025-12-06

## 2024-12-06 RX ORDER — ATORVASTATIN CALCIUM 40 MG/1
40 TABLET, FILM COATED ORAL
Qty: 30 TABLET | Refills: 12 | Status: SHIPPED | OUTPATIENT
Start: 2024-12-06 | End: 2025-12-31

## 2024-12-11 ENCOUNTER — PHARMACY VISIT (OUTPATIENT)
Dept: PHARMACY | Facility: CLINIC | Age: 69
End: 2024-12-11
Payer: MEDICARE

## 2024-12-11 ENCOUNTER — APPOINTMENT (OUTPATIENT)
Dept: OTOLARYNGOLOGY | Facility: HOSPITAL | Age: 69
End: 2024-12-11
Payer: COMMERCIAL

## 2024-12-17 PROCEDURE — RXMED WILLOW AMBULATORY MEDICATION CHARGE

## 2024-12-18 ENCOUNTER — PHARMACY VISIT (OUTPATIENT)
Dept: PHARMACY | Facility: CLINIC | Age: 69
End: 2024-12-18
Payer: MEDICARE

## 2024-12-20 ENCOUNTER — APPOINTMENT (OUTPATIENT)
Dept: PHARMACY | Facility: HOSPITAL | Age: 69
End: 2024-12-20
Payer: COMMERCIAL

## 2024-12-23 ENCOUNTER — APPOINTMENT (OUTPATIENT)
Dept: PHARMACY | Facility: HOSPITAL | Age: 69
End: 2024-12-23
Payer: COMMERCIAL

## 2024-12-23 DIAGNOSIS — E11.42 TYPE 2 DIABETES MELLITUS WITH DIABETIC POLYNEUROPATHY, WITH LONG-TERM CURRENT USE OF INSULIN: Primary | ICD-10-CM

## 2024-12-23 DIAGNOSIS — Z79.4 TYPE 2 DIABETES MELLITUS WITH DIABETIC POLYNEUROPATHY, WITH LONG-TERM CURRENT USE OF INSULIN: Primary | ICD-10-CM

## 2024-12-23 DIAGNOSIS — Z79.899 POLYPHARMACY: ICD-10-CM

## 2024-12-23 NOTE — PROGRESS NOTES
Clinical Pharmacy Appointment    Patient ID: Hilaire J Kiffer is a 69 y.o. male who presents for Diabetes.    Pt is here for Follow Up appointment.     Referring Provider: Yadi Contreras, *  PCP: Yadi Contreras MD   Last visit with PCP: 10/14/2024   Next visit with PCP: 1/13/2025      Subjective     Interval History  Changing plans on insurance, most do not cover exact diabetes medications; patient aware we are able to modify individual medications within class for adequate coverage  Upcoming surgery 1/15/2025  Did apply for [employer] coverage instead of [employer] for next year  Lantus is on formulary  Jardiance is on formulary  Victoza is not; however, Ozempic, Mounjaro, and Trulicity are all on formulary  Has Jardiance, Lantus, and Victoza enough for about 1 more month; will be able to switch medications along with CNP on 1/13/2025  Did send in medical documentation for  license on Friday     Patient Assistance for Victoza and Lantus approved through 9/2025. Will have to be renewed prior to that date to prevent lapse in coverage. Medication(s) will be received at no cost to patient from [facility].     HPI  DIABETES MELLITUS TYPE 2:    Diagnosed with diabetes. Known diabetic complications: CAD, neuropathy.  Does patient follow with Endocrinology: No- establishing care with  for 2025     Current diabetic medications include:  Victoza 1.8 mg once daily  Lantus 28 units daily  Metformin 1000 mg twice daily  Jardiance 10 mg daily    Clarifications to above regimen: cost concerns, qualifies for PAP  Adverse Effects: no concerns, tolerated well    Glucose Readings:  Unable to check sugar recently, he plans to start checking shortly    Denies thirst, hunger, vision changes    Any episodes of hypoglycemia? No, no recent low glucose .  Did patient treat episode of hypoglycemia appropriately? N/A    Lifestyle:  No big changes recently  Will be having surgery for broken screw- this  "will limit mobility for a while but hopefully improve mobility long-term    HX:  Physical Activity: is active, riding motorcycle; recently had loose screw in spine after surgery to where he is currently very careful    Secondary Prevention:  Statin? Yes  ACE-I/ARB? Yes  Aspirin? Yes    Pertinent PMH Review:  PMH of Pancreatitis: No  PMH of Retinopathy: No  PMH of Urinary Tract Infections: No  PMH of MTC: No     Medication Reconciliation:  No reported changes    Drug Interactions  The following drug interactions were noted:    Metformin dose is recommended to be limited to 1700 mg daily on concurrent Ranexa 1000 mg twice daily    Medication System Management  Patient's preferred pharmacy:  [facility]  Adherence/Organization: improved now on PAP  Affordability/Accessibility: PAP for Victoza and Lantus- new insurance changes may mean needs to switch medications      Objective   No Known Allergies  Social History     Social History Narrative    Not on file      Medication Review  Current Outpatient Medications   Medication Instructions    aspirin 81 mg, oral, Daily RT    atorvastatin (LIPITOR) 40 mg, oral, Daily RT    furosemide (LASIX) 40 mg, oral, Daily RT    Jardiance 10 mg, oral, Daily RT    Lantus Solostar U-100 Insulin 28 Units, subcutaneous, Nightly    lisinopril 2.5 mg, oral, Daily RT    loratadine (CLARITIN) 10 mg, oral, Daily RT    metFORMIN (Glucophage) 1,000 mg tablet 1 tablet, 2 times daily (morning and late afternoon)    metoprolol succinate XL (TOPROL-XL) 200 mg, oral, Daily RT    omeprazole (PRILOSEC) 40 mg, oral, Daily before breakfast, Do not crush or chew.    pen needle, diabetic 32 gauge x 5/32\" needle Use daily as directed    ranolazine (RANEXA) 1,000 mg, oral, 2 times daily, Do not crush, chew, or split.    spironolactone (ALDACTONE) 12.5 mg, oral, Daily RT    Victoza 3-Alfredo 1.8 mg, subcutaneous, Daily      Vitals  BP Readings from Last 2 Encounters:   10/29/24 105/72   10/25/24 96/58     BMI " "Readings from Last 1 Encounters:   10/29/24 31.95 kg/m²      Labs  A1C  Lab Results   Component Value Date    HGBA1C 10.5 (H) 10/15/2024    HGBA1C 6.3 (H) 05/24/2024    HGBA1C 6.8 (H) 01/25/2024     BMP  Lab Results   Component Value Date    CALCIUM 8.9 10/15/2024     10/15/2024    K 4.2 10/15/2024    CO2 28 10/15/2024     10/15/2024    BUN 16 10/15/2024    CREATININE 0.87 10/15/2024    EGFR >90 10/15/2024     LFTs  Lab Results   Component Value Date    ALT 18 10/15/2024    AST 12 10/15/2024    ALKPHOS 59 10/15/2024    BILITOT 0.7 10/15/2024     FLP  Lab Results   Component Value Date    TRIG 128 10/15/2024    CHOL 103 10/15/2024    LDLCALC 42 10/15/2024    HDL 35.6 10/15/2024     Urine Microalbumin  No results found for: \"MICROALBCREA\"  Weight Management  Wt Readings from Last 3 Encounters:   10/29/24 87.1 kg (192 lb)   10/25/24 86.2 kg (190 lb)   10/14/24 89.1 kg (196 lb 8 oz)      There is no height or weight on file to calculate BMI.     Assessment/Plan   Problem List Items Addressed This Visit       Type 2 diabetes mellitus with diabetic polyneuropathy, with long-term current use of insulin - Primary     Patient's goal A1c is < 7%.  Is pt at goal? No, last 10.5%- previous adherence concerns  Patient's SMBGs are not recently checked. Encouraged monitoring.  Rationale for plan: The patient will be switching from [employer] (current insurance) to [employer] in the new year. He currently has about 1 month of all diabetes medications remaining. Given he has the supply currently and he has not checked glucose, we did not adjust medications today. Coverage will switch from Victoza to an alternate GLP medication. Given elevated A1c, expect Mounjaro to provide greatest benefit at that time. Also has room to increase Jardiance to 25 mg daily. Once glucose is checked, can titrate these medications and insulin if needed.    Medication Changes:  CONTINUE  Metformin 1000 mg twice daily  Jardiance 10 mg daily  Lantus 28 " units nightly  Victoza 1.8 mg daily    Future Considerations:  Next visit, advise switching to Mounjaro 5 mg weekly for coverage and for improved glucose control  Plan to increase Jardiance to 25 mg daily  Titrate insulin as needed    Monitoring and Education:   Please check at least daily glucose as able         Relevant Orders    Referral to Clinical Pharmacy    Polypharmacy    Relevant Orders    Referral to Clinical Pharmacy         Clinical Pharmacist follow-up: 1/13/2025- will call during CNP visit, Telehealth visit    Continue all meds under the continuation of care with the referring provider and clinical pharmacy team.    Thank you,  Morena Sparks, PharmD  Clinical Pharmacist  [phone]    Verbal consent to manage patient's drug therapy was obtained from the patient. They were informed they may decline to participate or withdraw from participation in pharmacy services at any time.

## 2024-12-23 NOTE — Clinical Note
Juany, I will plan to reserve time during or back-to-back with your visit on 1/13 to formally switch these meds when his new coverage activates.

## 2024-12-23 NOTE — ASSESSMENT & PLAN NOTE
Patient's goal A1c is < 7%.  Is pt at goal? No, last 10.5%- previous adherence concerns  Patient's SMBGs are not recently checked. Encouraged monitoring.  Rationale for plan: The patient will be switching from [employer] (current insurance) to [employer] in the new year. He currently has about 1 month of all diabetes medications remaining. Given he has the supply currently and he has not checked glucose, we did not adjust medications today. Coverage will switch from Victoza to an alternate GLP medication. Given elevated A1c, expect Mounjaro to provide greatest benefit at that time. Also has room to increase Jardiance to 25 mg daily. Once glucose is checked, can titrate these medications and insulin if needed.    Medication Changes:  CONTINUE  Metformin 1000 mg twice daily  Jardiance 10 mg daily  Lantus 28 units nightly  Victoza 1.8 mg daily    Future Considerations:  Next visit, advise switching to Mounjaro 5 mg weekly for coverage and for improved glucose control  Plan to increase Jardiance to 25 mg daily  Titrate insulin as needed    Monitoring and Education:   Please check at least daily glucose as able

## 2025-01-02 ENCOUNTER — LAB (OUTPATIENT)
Dept: LAB | Facility: LAB | Age: 70
End: 2025-01-02
Payer: COMMERCIAL

## 2025-01-02 ENCOUNTER — APPOINTMENT (OUTPATIENT)
Dept: LAB | Facility: LAB | Age: 70
End: 2025-01-02
Payer: COMMERCIAL

## 2025-01-02 DIAGNOSIS — E55.9 VITAMIN D DEFICIENCY: ICD-10-CM

## 2025-01-02 DIAGNOSIS — Z79.4 TYPE 2 DIABETES MELLITUS WITH DIABETIC POLYNEUROPATHY, WITH LONG-TERM CURRENT USE OF INSULIN: ICD-10-CM

## 2025-01-02 DIAGNOSIS — E11.42 TYPE 2 DIABETES MELLITUS WITH DIABETIC POLYNEUROPATHY, WITH LONG-TERM CURRENT USE OF INSULIN: ICD-10-CM

## 2025-01-02 LAB
25(OH)D3 SERPL-MCNC: 20 NG/ML (ref 30–100)
ALBUMIN SERPL BCP-MCNC: 3.8 G/DL (ref 3.4–5)
ALP SERPL-CCNC: 49 U/L (ref 33–136)
ALT SERPL W P-5'-P-CCNC: 25 U/L (ref 10–52)
ANION GAP SERPL CALC-SCNC: 10 MMOL/L (ref 10–20)
AST SERPL W P-5'-P-CCNC: 27 U/L (ref 9–39)
BASOPHILS # BLD AUTO: 0.02 X10*3/UL (ref 0–0.1)
BASOPHILS NFR BLD AUTO: 0.4 %
BILIRUB SERPL-MCNC: 0.6 MG/DL (ref 0–1.2)
BUN SERPL-MCNC: 13 MG/DL (ref 6–23)
CALCIUM SERPL-MCNC: 8.8 MG/DL (ref 8.6–10.3)
CHLORIDE SERPL-SCNC: 103 MMOL/L (ref 98–107)
CHOLEST SERPL-MCNC: 88 MG/DL (ref 0–199)
CHOLESTEROL/HDL RATIO: 3.8
CO2 SERPL-SCNC: 31 MMOL/L (ref 21–32)
CREAT SERPL-MCNC: 0.76 MG/DL (ref 0.5–1.3)
EGFRCR SERPLBLD CKD-EPI 2021: >90 ML/MIN/1.73M*2
EOSINOPHIL # BLD AUTO: 0.06 X10*3/UL (ref 0–0.7)
EOSINOPHIL NFR BLD AUTO: 1.3 %
ERYTHROCYTE [DISTWIDTH] IN BLOOD BY AUTOMATED COUNT: 13.1 % (ref 11.5–14.5)
GLUCOSE SERPL-MCNC: 147 MG/DL (ref 74–99)
HCT VFR BLD AUTO: 45.7 % (ref 41–52)
HDLC SERPL-MCNC: 23.3 MG/DL
HGB BLD-MCNC: 15.6 G/DL (ref 13.5–17.5)
IMM GRANULOCYTES # BLD AUTO: 0.04 X10*3/UL (ref 0–0.7)
IMM GRANULOCYTES NFR BLD AUTO: 0.9 % (ref 0–0.9)
LDLC SERPL CALC-MCNC: 40 MG/DL
LYMPHOCYTES # BLD AUTO: 1.49 X10*3/UL (ref 1.2–4.8)
LYMPHOCYTES NFR BLD AUTO: 32.8 %
MCH RBC QN AUTO: 33.1 PG (ref 26–34)
MCHC RBC AUTO-ENTMCNC: 34.1 G/DL (ref 32–36)
MCV RBC AUTO: 97 FL (ref 80–100)
MONOCYTES # BLD AUTO: 0.58 X10*3/UL (ref 0.1–1)
MONOCYTES NFR BLD AUTO: 12.8 %
NEUTROPHILS # BLD AUTO: 2.35 X10*3/UL (ref 1.2–7.7)
NEUTROPHILS NFR BLD AUTO: 51.8 %
NON HDL CHOLESTEROL: 65 MG/DL (ref 0–149)
NRBC BLD-RTO: 0 /100 WBCS (ref 0–0)
PLATELET # BLD AUTO: 217 X10*3/UL (ref 150–450)
POTASSIUM SERPL-SCNC: 4.1 MMOL/L (ref 3.5–5.3)
PROT SERPL-MCNC: 6.4 G/DL (ref 6.4–8.2)
RBC # BLD AUTO: 4.72 X10*6/UL (ref 4.5–5.9)
SODIUM SERPL-SCNC: 140 MMOL/L (ref 136–145)
TRIGL SERPL-MCNC: 125 MG/DL (ref 0–149)
TSH SERPL-ACNC: 3.16 MIU/L (ref 0.44–3.98)
VLDL: 25 MG/DL (ref 0–40)
WBC # BLD AUTO: 4.5 X10*3/UL (ref 4.4–11.3)

## 2025-01-02 PROCEDURE — 80053 COMPREHEN METABOLIC PANEL: CPT

## 2025-01-02 PROCEDURE — 85025 COMPLETE CBC W/AUTO DIFF WBC: CPT

## 2025-01-02 PROCEDURE — 82306 VITAMIN D 25 HYDROXY: CPT

## 2025-01-02 PROCEDURE — 80061 LIPID PANEL: CPT

## 2025-01-02 PROCEDURE — 84443 ASSAY THYROID STIM HORMONE: CPT

## 2025-01-03 DIAGNOSIS — E55.9 VITAMIN D DEFICIENCY: ICD-10-CM

## 2025-01-03 LAB
EST. AVERAGE GLUCOSE BLD GHB EST-MCNC: 166 MG/DL
HBA1C MFR BLD: 7.4 %

## 2025-01-03 PROCEDURE — RXMED WILLOW AMBULATORY MEDICATION CHARGE

## 2025-01-03 RX ORDER — ERGOCALCIFEROL 1.25 MG/1
50000 CAPSULE ORAL WEEKLY
Qty: 8 CAPSULE | Refills: 0 | Status: SHIPPED | OUTPATIENT
Start: 2025-01-03 | End: 2025-02-28

## 2025-01-06 ENCOUNTER — HOSPITAL ENCOUNTER (OUTPATIENT)
Dept: RADIOLOGY | Facility: CLINIC | Age: 70
Discharge: HOME | End: 2025-01-06
Payer: COMMERCIAL

## 2025-01-06 ENCOUNTER — APPOINTMENT (OUTPATIENT)
Dept: PRIMARY CARE | Facility: CLINIC | Age: 70
End: 2025-01-06
Payer: COMMERCIAL

## 2025-01-06 VITALS
SYSTOLIC BLOOD PRESSURE: 113 MMHG | HEIGHT: 65 IN | DIASTOLIC BLOOD PRESSURE: 73 MMHG | OXYGEN SATURATION: 94 % | WEIGHT: 187 LBS | HEART RATE: 73 BPM | BODY MASS INDEX: 31.16 KG/M2 | TEMPERATURE: 97.1 F

## 2025-01-06 DIAGNOSIS — J40 BRONCHITIS: ICD-10-CM

## 2025-01-06 DIAGNOSIS — Z79.4 TYPE 2 DIABETES MELLITUS WITH DIABETIC POLYNEUROPATHY, WITH LONG-TERM CURRENT USE OF INSULIN: ICD-10-CM

## 2025-01-06 DIAGNOSIS — I47.29 PAROXYSMAL VENTRICULAR TACHYCARDIA (MULTI): ICD-10-CM

## 2025-01-06 DIAGNOSIS — M47.12 CERVICAL SPONDYLOSIS WITH MYELOPATHY: ICD-10-CM

## 2025-01-06 DIAGNOSIS — E55.9 VITAMIN D DEFICIENCY: ICD-10-CM

## 2025-01-06 DIAGNOSIS — J98.8 RESPIRATORY INFECTION: Primary | ICD-10-CM

## 2025-01-06 DIAGNOSIS — R29.898 RIGHT ARM WEAKNESS: ICD-10-CM

## 2025-01-06 DIAGNOSIS — J45.909 ASTHMA, UNSPECIFIED ASTHMA SEVERITY, UNSPECIFIED WHETHER COMPLICATED, UNSPECIFIED WHETHER PERSISTENT (HHS-HCC): ICD-10-CM

## 2025-01-06 DIAGNOSIS — I50.9 CONGESTIVE HEART FAILURE, NYHA CLASS 2, UNSPECIFIED CONGESTIVE HEART FAILURE TYPE: ICD-10-CM

## 2025-01-06 DIAGNOSIS — E11.42 TYPE 2 DIABETES MELLITUS WITH DIABETIC POLYNEUROPATHY, WITH LONG-TERM CURRENT USE OF INSULIN: ICD-10-CM

## 2025-01-06 DIAGNOSIS — J98.8 RESPIRATORY INFECTION: ICD-10-CM

## 2025-01-06 PROCEDURE — 1036F TOBACCO NON-USER: CPT | Performed by: NURSE PRACTITIONER

## 2025-01-06 PROCEDURE — 3078F DIAST BP <80 MM HG: CPT | Performed by: NURSE PRACTITIONER

## 2025-01-06 PROCEDURE — 71046 X-RAY EXAM CHEST 2 VIEWS: CPT | Performed by: RADIOLOGY

## 2025-01-06 PROCEDURE — 1160F RVW MEDS BY RX/DR IN RCRD: CPT | Performed by: NURSE PRACTITIONER

## 2025-01-06 PROCEDURE — 99215 OFFICE O/P EST HI 40 MIN: CPT | Performed by: NURSE PRACTITIONER

## 2025-01-06 PROCEDURE — 3008F BODY MASS INDEX DOCD: CPT | Performed by: NURSE PRACTITIONER

## 2025-01-06 PROCEDURE — 3051F HG A1C>EQUAL 7.0%<8.0%: CPT | Performed by: NURSE PRACTITIONER

## 2025-01-06 PROCEDURE — 4010F ACE/ARB THERAPY RXD/TAKEN: CPT | Performed by: NURSE PRACTITIONER

## 2025-01-06 PROCEDURE — 3074F SYST BP LT 130 MM HG: CPT | Performed by: NURSE PRACTITIONER

## 2025-01-06 PROCEDURE — 1123F ACP DISCUSS/DSCN MKR DOCD: CPT | Performed by: NURSE PRACTITIONER

## 2025-01-06 PROCEDURE — 1159F MED LIST DOCD IN RCRD: CPT | Performed by: NURSE PRACTITIONER

## 2025-01-06 PROCEDURE — 71046 X-RAY EXAM CHEST 2 VIEWS: CPT

## 2025-01-06 PROCEDURE — 3048F LDL-C <100 MG/DL: CPT | Performed by: NURSE PRACTITIONER

## 2025-01-06 RX ORDER — ALBUTEROL SULFATE AND BUDESONIDE 90; 80 UG/1; UG/1
2 AEROSOL, METERED RESPIRATORY (INHALATION) EVERY 6 HOURS PRN
Qty: 10.7 G | Refills: 3 | Status: SHIPPED | OUTPATIENT
Start: 2025-01-06 | End: 2025-01-07

## 2025-01-06 RX ORDER — AZITHROMYCIN 250 MG/1
TABLET, FILM COATED ORAL
Qty: 6 TABLET | Refills: 0 | Status: SHIPPED | OUTPATIENT
Start: 2025-01-06 | End: 2025-01-11

## 2025-01-06 RX ORDER — BENZONATATE 200 MG/1
200 CAPSULE ORAL 3 TIMES DAILY PRN
Qty: 42 CAPSULE | Refills: 0 | Status: SHIPPED | OUTPATIENT
Start: 2025-01-06 | End: 2025-02-05

## 2025-01-06 NOTE — ASSESSMENT & PLAN NOTE
Ongoing 1.5 weeks  Start Azithromycin  CXR now  Discussed risks to steroids due to CHF and DM, will give steroid inhaler and cough suppressant

## 2025-01-06 NOTE — PATIENT INSTRUCTIONS
Called and lvm asking pt to return phone call to schedule an appt     Electronically signed by Mariajose Garcia on 7/3/2024 at 2:33 PM     Flonase (fluticasone) one spray each nostril twice daily for 5-7 days, then once daily thereafter until symptoms resolve (this will help with congestion and cough)  Can take tessalon (benzonatate) as needed for cough  Can also  Delsym cough syrup    Start azithromycin   If Airsupra inhaler covered, start with 2 puffs at least 2-3 times a day for next week until symptoms improve    Chest x-ray today    Repeat vitamin d lab when done with prescription strength vitamin d, then begin vitamin D 2000IU daily (found over the counter)

## 2025-01-06 NOTE — ASSESSMENT & PLAN NOTE
New right arm weakness x1 week  Will defer to ortho surg who he will see tomorrow  Is supposed to have surgery 1/15/25 but feels he may cancel due to health

## 2025-01-06 NOTE — ASSESSMENT & PLAN NOTE
Reviewed [employer] Formulary 2025   Covered  - Brand name Lantus  - Generic Metformin with quantity limit  - Brand name Jardiance oral tabs with quantity limit    Not covered  - Victoza;  however, Mounjaro brand (with PA and QL) or Ozempic brand (with PA and QL) is covered    He will let me know when nearing end of victoza and will switch to mounjaro

## 2025-01-07 ENCOUNTER — PHARMACY VISIT (OUTPATIENT)
Dept: PHARMACY | Facility: CLINIC | Age: 70
End: 2025-01-07
Payer: COMMERCIAL

## 2025-01-13 ENCOUNTER — APPOINTMENT (OUTPATIENT)
Dept: PRIMARY CARE | Facility: CLINIC | Age: 70
End: 2025-01-13
Payer: COMMERCIAL

## (undated) DEVICE — DEVICE, INFLATION, CRE, 60CC, STERILE

## (undated) DEVICE — GLOVE, SURGICAL, PROTEXIS,  6.5, PF, LATEX

## (undated) DEVICE — SPONGE, GAUZE, XRAY DECT, 16 PLY, 4 X 4, W/MASTER DMT,STERILE

## (undated) DEVICE — NEEDLE, SCLEROTHERAPY, 23GA 4MM 1.8MM, CONTRAST

## (undated) DEVICE — TOWEL, SURGICAL, NEURO, O/R, 16 X 26, BLUE, STERILE

## (undated) DEVICE — TUBING, SUCTION, NON-CONDUCTIVE, W/CONNECT,.25 IN X 12 FT, STERILE, LF

## (undated) DEVICE — SYRINGE, 1 CC, LUER LOCK

## (undated) DEVICE — Device

## (undated) DEVICE — DRESSING, NON-ADHERENT, TELFA, OUCHLESS, 3 X 8 IN, STERILE

## (undated) DEVICE — SOLUTION KIT, ANTIFOG, 6CC, LF

## (undated) DEVICE — CONTAINER, SPECIMEN, 120 ML, STERILE

## (undated) DEVICE — LUBRICANT, SURGILUBE, STERILE, 2OZ

## (undated) DEVICE — SYRINGE, LUER LOCK, 12ML

## (undated) DEVICE — CATHETER, ESOPHAGEAL DILATION, 32MM OD X 55MM, INFINITY